# Patient Record
Sex: MALE | Employment: FULL TIME | URBAN - METROPOLITAN AREA
[De-identification: names, ages, dates, MRNs, and addresses within clinical notes are randomized per-mention and may not be internally consistent; named-entity substitution may affect disease eponyms.]

---

## 2017-03-24 ENCOUNTER — OFFICE VISIT (OUTPATIENT)
Dept: FAMILY MEDICINE CLINIC | Facility: CLINIC | Age: 57
End: 2017-03-24

## 2017-03-24 ENCOUNTER — HOSPITAL ENCOUNTER (OUTPATIENT)
Dept: GENERAL RADIOLOGY | Age: 57
Discharge: HOME | End: 2017-03-24
Attending: FAMILY MEDICINE
Payer: COMMERCIAL

## 2017-03-24 VITALS
RESPIRATION RATE: 20 BRPM | BODY MASS INDEX: 26.04 KG/M2 | SYSTOLIC BLOOD PRESSURE: 134 MMHG | DIASTOLIC BLOOD PRESSURE: 88 MMHG | TEMPERATURE: 98 F | HEIGHT: 71 IN | HEART RATE: 67 BPM | WEIGHT: 186 LBS

## 2017-03-24 DIAGNOSIS — R05.9 COUGH: Primary | ICD-10-CM

## 2017-03-24 DIAGNOSIS — R05.9 COUGH: ICD-10-CM

## 2017-03-24 PROCEDURE — 71020 XR CHEST PA + LAT CHEST (CPT=71020): CPT

## 2017-03-24 PROCEDURE — 99203 OFFICE O/P NEW LOW 30 MIN: CPT | Performed by: FAMILY MEDICINE

## 2017-03-24 PROCEDURE — 99212 OFFICE O/P EST SF 10 MIN: CPT | Performed by: FAMILY MEDICINE

## 2017-03-24 NOTE — PROGRESS NOTES
Blood pressure 134/88, pulse 67, temperature 98.2 °F (36.8 °C), temperature source Oral, resp. rate 20, height 5' 11\" (1.803 m), weight 186 lb (84.369 kg).   Patient presents today complaining of a 20 day history of a cough that is nocturnal.  He has a dog

## 2017-07-06 ENCOUNTER — OFFICE VISIT (OUTPATIENT)
Dept: PODIATRY CLINIC | Facility: CLINIC | Age: 57
End: 2017-07-06

## 2017-07-06 DIAGNOSIS — M72.2 PLANTAR FASCIITIS, LEFT: Primary | ICD-10-CM

## 2017-07-06 PROCEDURE — 99243 OFF/OP CNSLTJ NEW/EST LOW 30: CPT | Performed by: PODIATRIST

## 2017-07-06 PROCEDURE — 99212 OFFICE O/P EST SF 10 MIN: CPT | Performed by: PODIATRIST

## 2017-07-06 PROCEDURE — L3060 FOOT ARCH SUPP LONGITUD/META: HCPCS | Performed by: PODIATRIST

## 2017-07-06 RX ORDER — MELOXICAM 15 MG/1
15 TABLET ORAL
Qty: 30 TABLET | Refills: 1 | Status: CANCELLED | OUTPATIENT
Start: 2017-07-06

## 2017-07-06 NOTE — PROGRESS NOTES
HPI:    Patient ID: Emerita Kohler is a 60year old male. HPI   This pleasant 13-year-old male presents as a new patient to me on consult from [address] [place] [address]. Heel pain that has been present for as much as 4-5 months.   The pain came on suddenly and is u (primary encounter diagnosis)    No orders of the defined types were placed in this encounter.       Meds This Visit:  No prescriptions requested or ordered in this encounter    Imaging & Referrals:  None       WW#[id number]

## 2017-11-17 ENCOUNTER — OFFICE VISIT (OUTPATIENT)
Dept: FAMILY MEDICINE CLINIC | Facility: CLINIC | Age: 57
End: 2017-11-17

## 2017-11-17 VITALS
HEART RATE: 76 BPM | HEIGHT: 71 IN | DIASTOLIC BLOOD PRESSURE: 78 MMHG | WEIGHT: 188 LBS | BODY MASS INDEX: 26.32 KG/M2 | SYSTOLIC BLOOD PRESSURE: 130 MMHG

## 2017-11-17 DIAGNOSIS — Z00.00 ROUTINE PHYSICAL EXAMINATION: Primary | ICD-10-CM

## 2017-11-17 DIAGNOSIS — G89.29 CHRONIC MIDLINE THORACIC BACK PAIN: ICD-10-CM

## 2017-11-17 DIAGNOSIS — E78.00 PURE HYPERCHOLESTEROLEMIA: ICD-10-CM

## 2017-11-17 DIAGNOSIS — M54.6 CHRONIC MIDLINE THORACIC BACK PAIN: ICD-10-CM

## 2017-11-17 PROCEDURE — 99396 PREV VISIT EST AGE 40-64: CPT | Performed by: FAMILY MEDICINE

## 2017-11-17 PROCEDURE — 90715 TDAP VACCINE 7 YRS/> IM: CPT | Performed by: FAMILY MEDICINE

## 2017-11-17 PROCEDURE — 90471 IMMUNIZATION ADMIN: CPT | Performed by: FAMILY MEDICINE

## 2017-11-17 RX ORDER — NAPROXEN 500 MG/1
500 TABLET ORAL 2 TIMES DAILY WITH MEALS
Qty: 60 TABLET | Refills: 1 | Status: SHIPPED | OUTPATIENT
Start: 2017-11-17 | End: 2020-07-10 | Stop reason: ALTCHOICE

## 2017-11-17 NOTE — PROGRESS NOTES
Blood pressure 130/78, pulse 76, height 5' 11\" (1.803 m), weight 188 lb (85.3 kg). REASON FOR VISIT:    Shelley Norton is a 59year old male who presents for an [address]. There is no problem list on file for this patient. GLUCOSE, GLU      Gonorrhea Screening If high risk No results found for: GONOCOCCUS    HIV Screening For all adults age 22-65, older adults at increased risk No results found for: HIV    Syphilis Screening Screen if pregnant or high risk No results found f skin lesions  EYES: denies blurred vision or double vision  HEENT: denies nasal congestion, sinus pain or ST  LUNGS: denies shortness of breath with exertion  CARDIOVASCULAR: denies chest pain on exertion  GI: denies abdominal pain, denies heartburn  : d Td/Tdap once then every 10 years   HPV Males 11-21   Zoster (Shingles) 60 and older: one dose   Varicella 2 doses if not immune   MMR 1-2 doses if born after 1958 and not immune   1. Routine physical examination      2.  Chronic midline thoracic back pain

## 2017-11-29 ENCOUNTER — OFFICE VISIT (OUTPATIENT)
Dept: PHYSICAL THERAPY | Age: 57
End: 2017-11-29
Attending: FAMILY MEDICINE
Payer: COMMERCIAL

## 2017-11-29 DIAGNOSIS — M54.6 CHRONIC MIDLINE THORACIC BACK PAIN: ICD-10-CM

## 2017-11-29 DIAGNOSIS — G89.29 CHRONIC MIDLINE THORACIC BACK PAIN: ICD-10-CM

## 2017-11-29 PROCEDURE — 97162 PT EVAL MOD COMPLEX 30 MIN: CPT | Performed by: PHYSICAL THERAPIST

## 2017-11-29 NOTE — PROGRESS NOTES
CERVICAL SPINE EVALUATION:   Referring Physician: Bakari Piedra DO  Diagnosis: Chronic midline thoracic back pain (M54.6,G89.29) Date of Service: 11/29/2017   Date of Onset: 1 Month ago        SUBJECTIVE:   PATIENT SUMMARY:    Leslie salter a Patient to consistently perform their HEP and it's progression to maintain their improved condition. 2.  Patient to have reduced, centralized, and abolished symptoms in the neck to enable easier home, work, functional, and recreational activities.    3. Pa

## 2017-12-01 ENCOUNTER — OFFICE VISIT (OUTPATIENT)
Dept: PHYSICAL THERAPY | Age: 57
End: 2017-12-01
Attending: FAMILY MEDICINE
Payer: COMMERCIAL

## 2017-12-01 PROCEDURE — 97110 THERAPEUTIC EXERCISES: CPT | Performed by: PHYSICAL THERAPIST

## 2017-12-01 NOTE — PROGRESS NOTES
Date: 12/1/2017     Dx: Chronic midline thoracic back pain (M54.6,G89.29)         Authorized # of Visits:  18       Referring MD: Troy Sanchez  Next MD visit: none scheduled    Medication Changes since last visit?: No    Subjective: Patient reports that his ne

## 2017-12-06 ENCOUNTER — APPOINTMENT (OUTPATIENT)
Dept: PHYSICAL THERAPY | Age: 57
End: 2017-12-06
Attending: FAMILY MEDICINE
Payer: COMMERCIAL

## 2017-12-06 ENCOUNTER — TELEPHONE (OUTPATIENT)
Dept: PHYSICAL THERAPY | Age: 57
End: 2017-12-06

## 2017-12-13 ENCOUNTER — OFFICE VISIT (OUTPATIENT)
Dept: PHYSICAL THERAPY | Age: 57
End: 2017-12-13
Attending: FAMILY MEDICINE
Payer: COMMERCIAL

## 2017-12-13 PROCEDURE — 97110 THERAPEUTIC EXERCISES: CPT | Performed by: PHYSICAL THERAPIST

## 2017-12-15 ENCOUNTER — OFFICE VISIT (OUTPATIENT)
Dept: PHYSICAL THERAPY | Age: 57
End: 2017-12-15
Attending: FAMILY MEDICINE
Payer: COMMERCIAL

## 2017-12-15 PROCEDURE — 97110 THERAPEUTIC EXERCISES: CPT | Performed by: PHYSICAL THERAPIST

## 2017-12-15 NOTE — PROGRESS NOTES
Date: 12/15/2017     Dx: Chronic midline thoracic back pain (M54.6,G89.29)         Authorized # of Visits:  18       Referring MD: Christiano Ordonez  Next MD visit: none scheduled    Medication Changes since last visit?: No    Subjective: Patient reports that he ha Patient was instructed on his new HEP (c/s retraction with self OP and supine c/s extension hang). Goals:   Goals     • Therapy Goals            1. Patient to consistently perform their HEP and it's progression to maintain their improved condition.

## 2017-12-20 ENCOUNTER — OFFICE VISIT (OUTPATIENT)
Dept: PHYSICAL THERAPY | Age: 57
End: 2017-12-20
Attending: FAMILY MEDICINE
Payer: COMMERCIAL

## 2017-12-20 PROCEDURE — 97110 THERAPEUTIC EXERCISES: CPT | Performed by: PHYSICAL THERAPIST

## 2017-12-20 NOTE — PROGRESS NOTES
Date: 12/20/2017     Dx: Chronic midline thoracic back pain (M54.6,G89.29)         Authorized # of Visits:  18       Referring MD: Crissy Rojas MD visit: none scheduled    Medication Changes since last visit?: No    Subjective: Patient reports that he ha correction in supine and SLY     Prone: (B) UE ext., h.abd., rhomboids, scaption 2 lbs 10 reps x 5 cts; NE    Seated: c/s retraction with self OP x 10 reps;                 Assessment:   Patient is slowly progressing with c/s retractions with overpressure

## 2017-12-22 ENCOUNTER — OFFICE VISIT (OUTPATIENT)
Dept: PHYSICAL THERAPY | Age: 57
End: 2017-12-22
Attending: FAMILY MEDICINE
Payer: COMMERCIAL

## 2017-12-22 PROCEDURE — 97110 THERAPEUTIC EXERCISES: CPT | Performed by: PHYSICAL THERAPIST

## 2017-12-22 NOTE — PROGRESS NOTES
Date: 12/22/2017     Dx: Chronic midline thoracic back pain (M54.6,G89.29)         Authorized # of Visits:  18       Referring MD: Vicky Dan  Next MD visit: none scheduled    Medication Changes since last visit?: No    Subjective: Patient reports that he ha lumbar roll: c/s retraction + self OP 2 x 10 reps; NE     + manual OP 3 x 10 reps; Dec, B     + retraction mobs spine 3 x 10 reps; Dec, A    Sleeping posture correction in supine and SLY     Prone: (B) UE ext., h.abd., rhomboids, scaption 2 lbs 10 reps x 5

## 2017-12-27 ENCOUNTER — OFFICE VISIT (OUTPATIENT)
Dept: PHYSICAL THERAPY | Age: 57
End: 2017-12-27
Attending: FAMILY MEDICINE
Payer: COMMERCIAL

## 2017-12-27 PROCEDURE — 97110 THERAPEUTIC EXERCISES: CPT | Performed by: PHYSICAL THERAPIST

## 2017-12-27 NOTE — PROGRESS NOTES
Date: 12/27/2017     Dx: Chronic midline thoracic back pain (M54.6,G89.29)         Authorized # of Visits:  18       Referring MD: Merly Rojas MD visit: none scheduled    Medication Changes since last visit?: No    Subjective: Patient sates that the (R) Scifit: L4 3 mins fwd/bkwd; postural training/conditioning    Sitting posture correction with lumbar roll: c/s retraction + self OP 2 x 10 reps; NE     + manual OP 3 x 10 reps; Dec, B     + retraction mobs spine 3 x 10 reps; Dec, A    Sleeping posture bandar symptoms in the neck to enable easier home, work, functional, and recreational activities. 3. Patient to have WNL of CROM in all directions to allow a return to all normal activities (driving, watching TV) without compensation of deviation.    4. Patient

## 2017-12-29 ENCOUNTER — APPOINTMENT (OUTPATIENT)
Dept: PHYSICAL THERAPY | Age: 57
End: 2017-12-29
Attending: FAMILY MEDICINE
Payer: COMMERCIAL

## 2018-01-05 ENCOUNTER — APPOINTMENT (OUTPATIENT)
Dept: PHYSICAL THERAPY | Age: 58
End: 2018-01-05
Attending: FAMILY MEDICINE
Payer: COMMERCIAL

## 2018-01-29 ENCOUNTER — APPOINTMENT (OUTPATIENT)
Dept: LAB | Age: 58
End: 2018-01-29
Attending: FAMILY MEDICINE
Payer: COMMERCIAL

## 2018-01-29 DIAGNOSIS — E78.00 PURE HYPERCHOLESTEROLEMIA: ICD-10-CM

## 2018-01-29 LAB
ALT SERPL-CCNC: 21 U/L (ref 17–63)
ANION GAP SERPL CALC-SCNC: 7 MMOL/L (ref 0–18)
AST SERPL-CCNC: 24 U/L (ref 15–41)
BUN SERPL-MCNC: 13 MG/DL (ref 8–20)
BUN/CREAT SERPL: 13.5 (ref 10–20)
CALCIUM SERPL-MCNC: 9.1 MG/DL (ref 8.5–10.5)
CHLORIDE SERPL-SCNC: 107 MMOL/L (ref 95–110)
CHOLEST SERPL-MCNC: 252 MG/DL (ref 110–200)
CO2 SERPL-SCNC: 23 MMOL/L (ref 22–32)
CREAT SERPL-MCNC: 0.96 MG/DL (ref 0.5–1.5)
GLUCOSE SERPL-MCNC: 100 MG/DL (ref 70–99)
HDLC SERPL-MCNC: 98 MG/DL
LDLC SERPL CALC-MCNC: 123 MG/DL (ref 0–99)
NONHDLC SERPL-MCNC: 154 MG/DL
OSMOLALITY UR CALC.SUM OF ELEC: 284 MOSM/KG (ref 275–295)
POTASSIUM SERPL-SCNC: 3.6 MMOL/L (ref 3.3–5.1)
PSA SERPL-MCNC: 0.7 NG/ML (ref 0–4)
SODIUM SERPL-SCNC: 137 MMOL/L (ref 136–144)
TRIGL SERPL-MCNC: 156 MG/DL (ref 1–149)
TSH SERPL-ACNC: 1.63 UIU/ML (ref 0.45–5.33)

## 2018-01-29 PROCEDURE — 84450 TRANSFERASE (AST) (SGOT): CPT

## 2018-01-29 PROCEDURE — 84443 ASSAY THYROID STIM HORMONE: CPT

## 2018-01-29 PROCEDURE — 84460 ALANINE AMINO (ALT) (SGPT): CPT

## 2018-01-29 PROCEDURE — 80061 LIPID PANEL: CPT

## 2018-01-29 PROCEDURE — 80048 BASIC METABOLIC PNL TOTAL CA: CPT

## 2018-01-29 PROCEDURE — 36415 COLL VENOUS BLD VENIPUNCTURE: CPT

## 2018-01-31 ENCOUNTER — TELEPHONE (OUTPATIENT)
Dept: FAMILY MEDICINE CLINIC | Facility: CLINIC | Age: 58
End: 2018-01-31

## 2018-01-31 DIAGNOSIS — N50.819 TESTICULAR PAIN: Primary | ICD-10-CM

## 2018-03-16 ENCOUNTER — HOSPITAL ENCOUNTER (OUTPATIENT)
Dept: GENERAL RADIOLOGY | Age: 58
Discharge: HOME | End: 2018-03-16
Attending: FAMILY MEDICINE

## 2018-03-16 ENCOUNTER — HOSPITAL ENCOUNTER (OUTPATIENT)
Dept: CT IMAGING | Age: 58
Discharge: HOME | End: 2018-03-16
Attending: FAMILY MEDICINE

## 2018-03-16 DIAGNOSIS — Z13.6 SCREENING FOR HEART DISEASE: ICD-10-CM

## 2018-03-16 NOTE — PROGRESS NOTES
Pt seen at [facility], [facility] for CTHS:  PRELIMINARY SCORE= 0.0  BP= 122/78  Cholestec labs as follows: Labs done 1/29/18  TC=  HDL=  LDL=  TG=  GLUCOSE=  All results and risk factors discussed with patient; all questions and concerns addressed.   Educational h

## 2019-02-27 ENCOUNTER — TELEPHONE (OUTPATIENT)
Dept: FAMILY MEDICINE CLINIC | Facility: CLINIC | Age: 59
End: 2019-02-27

## 2019-05-22 ENCOUNTER — APPOINTMENT (OUTPATIENT)
Dept: OTHER | Facility: HOSPITAL | Age: 59
End: 2019-05-22
Attending: EMERGENCY MEDICINE

## 2019-08-28 ENCOUNTER — OFFICE VISIT (OUTPATIENT)
Dept: FAMILY MEDICINE CLINIC | Facility: CLINIC | Age: 59
End: 2019-08-28
Payer: COMMERCIAL

## 2019-08-28 VITALS
SYSTOLIC BLOOD PRESSURE: 120 MMHG | HEIGHT: 71 IN | DIASTOLIC BLOOD PRESSURE: 80 MMHG | WEIGHT: 191.38 LBS | BODY MASS INDEX: 26.79 KG/M2 | HEART RATE: 70 BPM

## 2019-08-28 DIAGNOSIS — M54.6 CHRONIC BILATERAL THORACIC BACK PAIN: ICD-10-CM

## 2019-08-28 DIAGNOSIS — Z00.00 ROUTINE PHYSICAL EXAMINATION: Primary | ICD-10-CM

## 2019-08-28 DIAGNOSIS — G89.29 CHRONIC BILATERAL THORACIC BACK PAIN: ICD-10-CM

## 2019-08-28 DIAGNOSIS — N50.819 TESTICULAR PAIN: ICD-10-CM

## 2019-08-28 DIAGNOSIS — E78.00 PURE HYPERCHOLESTEROLEMIA: ICD-10-CM

## 2019-08-28 DIAGNOSIS — Z12.11 ENCOUNTER FOR SCREENING COLONOSCOPY: ICD-10-CM

## 2019-08-28 PROCEDURE — 99396 PREV VISIT EST AGE 40-64: CPT | Performed by: FAMILY MEDICINE

## 2019-08-28 NOTE — PROGRESS NOTES
Blood pressure 120/80, pulse 70, height 5' 11\" (1.803 m), weight 191 lb 6.4 oz (86.8 kg). REASON FOR VISIT:    Marshall Jackson is a 59year old male who presents for an [address].   COMPLAINS OF INTERMITTENT TESTICULAR PAIN ALSO THORACIC found for: [facility]       SPECIFIC DISEASE MONITORING Internal Lab or Procedure   No disease specific diagnoses    ALLERGIES:   No Known Allergies  CURRENT MEDICATIONS:     Current Outpatient Medications:  naproxen (NAPROSYN) 500 MG Oral Tab Take 1 table deferred  MUSCULOSKELETAL: back is not tender, FROM of the back  EXTREMITIES: no cyanosis, clubbing or edema  NEURO: Oriented times three, cranial nerves are intact, motor and sensory are grossly intact      ASSESSMENT AND OTHER RELEVANT CHRONIC CONDITIONS colonoscopy    - CT CALCIUM SCORING; Future  - GASTRO - INTERNAL    3. Pure hypercholesterolemia    - COMP METABOLIC PANEL (14); Future  - LIPID PANEL; Future  - PSA SCREEN; Future  - TSH W REFLEX TO FREE T4; Future  - CT CALCIUM SCORING; Future    4.  Test

## 2019-08-29 ENCOUNTER — APPOINTMENT (OUTPATIENT)
Dept: LAB | Age: 59
End: 2019-08-29
Attending: FAMILY MEDICINE
Payer: COMMERCIAL

## 2019-08-29 DIAGNOSIS — Z00.00 ROUTINE PHYSICAL EXAMINATION: ICD-10-CM

## 2019-08-29 DIAGNOSIS — E78.00 PURE HYPERCHOLESTEROLEMIA: ICD-10-CM

## 2019-08-29 LAB
ALBUMIN SERPL-MCNC: 3.6 G/DL (ref 3.4–5)
ALBUMIN/GLOB SERPL: 0.9 {RATIO} (ref 1–2)
ALP LIVER SERPL-CCNC: 67 U/L (ref 45–117)
ALT SERPL-CCNC: 21 U/L (ref 16–61)
ANION GAP SERPL CALC-SCNC: 7 MMOL/L (ref 0–18)
AST SERPL-CCNC: 19 U/L (ref 15–37)
BILIRUB SERPL-MCNC: 0.7 MG/DL (ref 0.1–2)
BUN BLD-MCNC: 14 MG/DL (ref 7–18)
BUN/CREAT SERPL: 15.4 (ref 10–20)
CALCIUM BLD-MCNC: 9.1 MG/DL (ref 8.5–10.1)
CHLORIDE SERPL-SCNC: 106 MMOL/L (ref 98–112)
CHOLEST SMN-MCNC: 224 MG/DL (ref ?–200)
CO2 SERPL-SCNC: 26 MMOL/L (ref 21–32)
COMPLEXED PSA SERPL-MCNC: 0.76 NG/ML (ref ?–4)
CREAT BLD-MCNC: 0.91 MG/DL (ref 0.7–1.3)
GLOBULIN PLAS-MCNC: 4.2 G/DL (ref 2.8–4.4)
GLUCOSE BLD-MCNC: 103 MG/DL (ref 70–99)
HDLC SERPL-MCNC: 107 MG/DL (ref 40–59)
LDLC SERPL CALC-MCNC: 91 MG/DL (ref ?–100)
M PROTEIN MFR SERPL ELPH: 7.8 G/DL (ref 6.4–8.2)
NONHDLC SERPL-MCNC: 117 MG/DL (ref ?–130)
OSMOLALITY SERPL CALC.SUM OF ELEC: 289 MOSM/KG (ref 275–295)
PATIENT FASTING: YES
PATIENT FASTING: YES
POTASSIUM SERPL-SCNC: 3.9 MMOL/L (ref 3.5–5.1)
SODIUM SERPL-SCNC: 139 MMOL/L (ref 136–145)
TRIGL SERPL-MCNC: 131 MG/DL (ref 30–149)
TSI SER-ACNC: 1.85 MIU/ML (ref 0.36–3.74)
VLDLC SERPL CALC-MCNC: 26 MG/DL (ref 0–30)

## 2019-08-29 PROCEDURE — 84443 ASSAY THYROID STIM HORMONE: CPT

## 2019-08-29 PROCEDURE — 80061 LIPID PANEL: CPT

## 2019-08-29 PROCEDURE — 80053 COMPREHEN METABOLIC PANEL: CPT

## 2019-08-29 PROCEDURE — 36415 COLL VENOUS BLD VENIPUNCTURE: CPT

## 2019-12-06 ENCOUNTER — HOSPITAL ENCOUNTER (OUTPATIENT)
Dept: ULTRASOUND IMAGING | Age: 59
Discharge: HOME | End: 2019-12-06
Attending: FAMILY MEDICINE
Payer: COMMERCIAL

## 2019-12-06 DIAGNOSIS — N50.819 TESTICULAR PAIN: ICD-10-CM

## 2019-12-06 PROCEDURE — 93975 VASCULAR STUDY: CPT | Performed by: FAMILY MEDICINE

## 2019-12-06 PROCEDURE — 76870 US EXAM SCROTUM: CPT | Performed by: FAMILY MEDICINE

## 2020-03-24 ENCOUNTER — TELEPHONE (OUTPATIENT)
Dept: FAMILY MEDICINE CLINIC | Facility: CLINIC | Age: 60
End: 2020-03-24

## 2020-03-24 DIAGNOSIS — E78.00 PURE HYPERCHOLESTEROLEMIA: Primary | ICD-10-CM

## 2020-03-24 RX ORDER — CLOBETASOL PROPIONATE 0.5 MG/ML
1 LOTION TOPICAL 2 TIMES DAILY
Qty: 1 BOTTLE | Refills: 1 | Status: SHIPPED | OUTPATIENT
Start: 2020-03-24

## 2020-06-26 DIAGNOSIS — Z78.9 PARTICIPANT IN HEALTH AND WELLNESS PLAN: Primary | ICD-10-CM

## 2020-07-03 ENCOUNTER — NURSE ONLY (OUTPATIENT)
Dept: LAB | Age: 60
End: 2020-07-03
Attending: PREVENTIVE MEDICINE

## 2020-07-03 DIAGNOSIS — Z78.9 PARTICIPANT IN HEALTH AND WELLNESS PLAN: ICD-10-CM

## 2020-07-03 PROCEDURE — 86769 SARS-COV-2 COVID-19 ANTIBODY: CPT

## 2020-07-07 LAB — SARS-COV-2 IGG SERPLBLD QL IA.RAPID: NEGATIVE

## 2020-07-10 ENCOUNTER — HOSPITAL ENCOUNTER (OUTPATIENT)
Dept: GENERAL RADIOLOGY | Age: 60
Discharge: HOME | End: 2020-07-10
Attending: FAMILY MEDICINE
Payer: COMMERCIAL

## 2020-07-10 ENCOUNTER — OFFICE VISIT (OUTPATIENT)
Dept: FAMILY MEDICINE CLINIC | Facility: CLINIC | Age: 60
End: 2020-07-10
Payer: COMMERCIAL

## 2020-07-10 VITALS
SYSTOLIC BLOOD PRESSURE: 156 MMHG | DIASTOLIC BLOOD PRESSURE: 92 MMHG | WEIGHT: 192.5 LBS | HEART RATE: 50 BPM | HEIGHT: 71 IN | BODY MASS INDEX: 26.95 KG/M2

## 2020-07-10 DIAGNOSIS — M54.12 CERVICAL RADICULOPATHY: ICD-10-CM

## 2020-07-10 DIAGNOSIS — Z12.11 ENCOUNTER FOR SCREENING COLONOSCOPY: Primary | ICD-10-CM

## 2020-07-10 PROCEDURE — 72040 X-RAY EXAM NECK SPINE 2-3 VW: CPT | Performed by: FAMILY MEDICINE

## 2020-07-10 PROCEDURE — 99214 OFFICE O/P EST MOD 30 MIN: CPT | Performed by: FAMILY MEDICINE

## 2020-07-10 RX ORDER — METHYLPREDNISOLONE 4 MG/1
TABLET ORAL
Qty: 1 KIT | Refills: 1 | Status: SHIPPED | OUTPATIENT
Start: 2020-07-10

## 2020-07-10 NOTE — PROGRESS NOTES
Blood pressure (!) 156/92, pulse 50, height 5' 11\" (1.803 m), weight 192 lb 8 oz (87.3 kg). Patient presents today complaining of numbness of the left arm that is intermittent. Denies weakness. This began a week ago.   Reports that he does have some d

## 2020-07-27 ENCOUNTER — APPOINTMENT (OUTPATIENT)
Dept: OTHER | Facility: HOSPITAL | Age: 60
End: 2020-07-27
Attending: EMERGENCY MEDICINE

## 2020-07-30 ENCOUNTER — OFFICE VISIT (OUTPATIENT)
Dept: DERMATOLOGY CLINIC | Facility: CLINIC | Age: 60
End: 2020-07-30
Payer: COMMERCIAL

## 2020-07-30 DIAGNOSIS — L63.9 ALOPECIA AREATA: Primary | ICD-10-CM

## 2020-07-30 PROCEDURE — 99202 OFFICE O/P NEW SF 15 MIN: CPT | Performed by: DERMATOLOGY

## 2020-07-30 RX ORDER — CLOBETASOL PROPIONATE 0.46 MG/ML
SOLUTION TOPICAL
Qty: 50 ML | Refills: 6 | Status: SHIPPED | OUTPATIENT
Start: 2020-07-30

## 2020-08-03 NOTE — PROGRESS NOTES
Yadi Sanderson is a 62year old male. Patient presents with:  Derm Problem: New pt presents with hair loss to posterior scalp x3 months.  rx'd clobetasol with no improvement. Other: pt c/o itching to whole scalp. No treatments tried. on file    Tobacco Use      Smoking status: Former Smoker    Substance and Sexual Activity      Alcohol use:  Yes        Alcohol/week: 0.0 standard drinks        Comment: sometimes 1 beer a day      Drug use: No      Sexual activity: Not on file    Lifestyl difference with the clobetasol. Notes some itching irritation more diffusely at times    Eyebrows eyelashes have been fine nothing else really new or different  Patient presents with concerns above. ROS:    Denies any other systemic complaints.   No f p.r.n. The patient indicates understanding of these issues and agrees to the plan. The patient is asked to return as noted in follow-up /as noted above    This note was generated using Dragon voice recognition software.   Please contact me regarding any

## 2020-08-07 ENCOUNTER — TELEPHONE (OUTPATIENT)
Dept: GASTROENTEROLOGY | Facility: CLINIC | Age: 60
End: 2020-08-07

## 2020-08-07 ENCOUNTER — OFFICE VISIT (OUTPATIENT)
Dept: GASTROENTEROLOGY | Facility: CLINIC | Age: 60
End: 2020-08-07
Payer: COMMERCIAL

## 2020-08-07 VITALS
HEIGHT: 71 IN | BODY MASS INDEX: 26.85 KG/M2 | WEIGHT: 191.81 LBS | DIASTOLIC BLOOD PRESSURE: 57 MMHG | SYSTOLIC BLOOD PRESSURE: 128 MMHG | HEART RATE: 57 BPM

## 2020-08-07 DIAGNOSIS — Z12.11 SCREENING FOR COLORECTAL CANCER: Primary | ICD-10-CM

## 2020-08-07 DIAGNOSIS — Z98.890 HISTORY OF COLONOSCOPY: ICD-10-CM

## 2020-08-07 DIAGNOSIS — Z12.12 SCREENING FOR COLORECTAL CANCER: Primary | ICD-10-CM

## 2020-08-07 DIAGNOSIS — Z12.11 COLON CANCER SCREENING: Primary | ICD-10-CM

## 2020-08-07 DIAGNOSIS — K64.9 HEMORRHOIDS, UNSPECIFIED HEMORRHOID TYPE: ICD-10-CM

## 2020-08-07 PROCEDURE — 3074F SYST BP LT 130 MM HG: CPT | Performed by: INTERNAL MEDICINE

## 2020-08-07 PROCEDURE — 3078F DIAST BP <80 MM HG: CPT | Performed by: INTERNAL MEDICINE

## 2020-08-07 PROCEDURE — 99243 OFF/OP CNSLTJ NEW/EST LOW 30: CPT | Performed by: INTERNAL MEDICINE

## 2020-08-07 PROCEDURE — 3008F BODY MASS INDEX DOCD: CPT | Performed by: INTERNAL MEDICINE

## 2020-08-07 RX ORDER — POLYETHYLENE GLYCOL 3350, SODIUM CHLORIDE, SODIUM BICARBONATE, POTASSIUM CHLORIDE 420; 11.2; 5.72; 1.48 G/4L; G/4L; G/4L; G/4L
POWDER, FOR SOLUTION ORAL
Qty: 1 BOTTLE | Refills: 0 | Status: ON HOLD | OUTPATIENT
Start: 2020-08-07 | End: 2020-09-15

## 2020-08-07 NOTE — PATIENT INSTRUCTIONS
1. Schedule colonoscopy with monitored anesthesia care (MAC) at [address], or [facility]    2.  bowel prep from pharmacy ([facility] )    3. Continue all medications for procedure    4. Read all bowel prep instructions carefully    5.  AVOI

## 2020-08-07 NOTE — TELEPHONE ENCOUNTER
Scheduled for: Colonoscopy 67164  Provider Name:  Dr Domo Desir  Date:  09/15/2020  Location: [facility]   Sedation:  MAC  Time:  12:15PM (pt is aware to arrive at 11:15AM)  Prep:  Trilyte  Meds/Allergies Reconciled?:    Physician reviewed    Diagnosis with codes

## 2020-08-07 NOTE — H&P
[facility], [facility] - Gastroenterology                                                                                                  Clinic History and Physical kg).    General:awake, cooperative, no acute distress  HEENT: EOMI, no scleral icterus, MMM; oral pharnyx is without exudates or lesions  Neck: no lymphadenopathy; thyroid is not enlarged and without nodules  CV: RRR  Resp: non-labored breathing  Abd: soft

## 2020-09-12 ENCOUNTER — APPOINTMENT (OUTPATIENT)
Dept: LAB | Age: 60
End: 2020-09-12
Attending: INTERNAL MEDICINE
Payer: COMMERCIAL

## 2020-09-12 DIAGNOSIS — Z01.818 PREOP TESTING: ICD-10-CM

## 2020-09-14 LAB — SARS-COV-2 RNA RESP QL NAA+PROBE: NOT DETECTED

## 2020-09-15 ENCOUNTER — ANESTHESIA (OUTPATIENT)
Dept: ENDOSCOPY | Age: 60
End: 2020-09-15
Payer: COMMERCIAL

## 2020-09-15 ENCOUNTER — ANESTHESIA EVENT (OUTPATIENT)
Dept: ENDOSCOPY | Age: 60
End: 2020-09-15
Payer: COMMERCIAL

## 2020-09-15 ENCOUNTER — HOSPITAL ENCOUNTER (OUTPATIENT)
Age: 60
Setting detail: HOSPITAL OUTPATIENT SURGERY
Discharge: HOME | End: 2020-09-15
Attending: INTERNAL MEDICINE | Admitting: INTERNAL MEDICINE
Payer: COMMERCIAL

## 2020-09-15 VITALS
SYSTOLIC BLOOD PRESSURE: 146 MMHG | BODY MASS INDEX: 26.04 KG/M2 | WEIGHT: 186 LBS | HEIGHT: 71 IN | TEMPERATURE: 99 F | OXYGEN SATURATION: 98 % | DIASTOLIC BLOOD PRESSURE: 85 MMHG | HEART RATE: 50 BPM | RESPIRATION RATE: 14 BRPM

## 2020-09-15 DIAGNOSIS — Z12.11 COLON CANCER SCREENING: ICD-10-CM

## 2020-09-15 DIAGNOSIS — Z01.818 PREOP TESTING: Primary | ICD-10-CM

## 2020-09-15 PROCEDURE — 45385 COLONOSCOPY W/LESION REMOVAL: CPT | Performed by: INTERNAL MEDICINE

## 2020-09-15 PROCEDURE — 99070 SPECIAL SUPPLIES PHYS/QHP: CPT | Performed by: INTERNAL MEDICINE

## 2020-09-15 PROCEDURE — 45380 COLONOSCOPY AND BIOPSY: CPT | Performed by: INTERNAL MEDICINE

## 2020-09-15 RX ORDER — SODIUM CHLORIDE, SODIUM LACTATE, POTASSIUM CHLORIDE, CALCIUM CHLORIDE 600; 310; 30; 20 MG/100ML; MG/100ML; MG/100ML; MG/100ML
INJECTION, SOLUTION INTRAVENOUS CONTINUOUS
Status: CANCELLED | OUTPATIENT
Start: 2020-09-15

## 2020-09-15 RX ORDER — SODIUM CHLORIDE, SODIUM LACTATE, POTASSIUM CHLORIDE, CALCIUM CHLORIDE 600; 310; 30; 20 MG/100ML; MG/100ML; MG/100ML; MG/100ML
INJECTION, SOLUTION INTRAVENOUS CONTINUOUS
Status: DISCONTINUED | OUTPATIENT
Start: 2020-09-15 | End: 2020-09-15

## 2020-09-15 RX ORDER — NALOXONE HYDROCHLORIDE 0.4 MG/ML
80 INJECTION, SOLUTION INTRAMUSCULAR; INTRAVENOUS; SUBCUTANEOUS AS NEEDED
Status: CANCELLED | OUTPATIENT
Start: 2020-09-15 | End: 2020-09-15

## 2020-09-15 RX ADMIN — SODIUM CHLORIDE, SODIUM LACTATE, POTASSIUM CHLORIDE, CALCIUM CHLORIDE: 600; 310; 30; 20 INJECTION, SOLUTION INTRAVENOUS at 13:26:00

## 2020-09-15 RX ADMIN — SODIUM CHLORIDE, SODIUM LACTATE, POTASSIUM CHLORIDE, CALCIUM CHLORIDE: 600; 310; 30; 20 INJECTION, SOLUTION INTRAVENOUS at 13:58:00

## 2020-09-15 NOTE — OPERATIVE REPORT
Colonoscopy Report    Shruthi Nick     1960 Age 62year old   PCP Lulu Ryan.  Fallon Huang MD     Date of procedure: 09/15/20    Procedure: Colonoscopy w/ biopsy and snare polypectomy    Pre-operative diagnosis: colo immediate postoperative complications. The patient’s vital signs were monitored throughout the procedure and remained stable.     Estimated blood loss: insignificant    Specimens collected:  Colon and rectal polyps    Complications: none     Colonoscopy fin

## 2020-09-15 NOTE — ANESTHESIA PREPROCEDURE EVALUATION
Anesthesia PreOp Note    HPI:     Radha Cheatham is a 62year old male who presents for preoperative consultation requested by: Andriy Krishnamurthy MD    Date of Surgery: 9/15/2020    Procedure(s):  COLONOSCOPY  Indication: Colon cancer screening    Rele file      Transportation needs:        Medical: Not on file        Non-medical: Not on file    Tobacco Use      Smoking status: Former Smoker      Smokeless tobacco: Never Used    Substance and Sexual Activity      Alcohol use:  Yes        Alcohol/week: 0.0 respiration is 17.    09/15/20  1230   BP: 146/85   Pulse: 53   Resp: 17   Temp: 98.6 °F (37 °C)   TempSrc: Oral   Weight: 84.4 kg (186 lb)   Height: 1.803 m (5' 11\")        Anesthesia Evaluation     Patient summary reviewed and Nursing notes reviewed

## 2020-09-15 NOTE — ANESTHESIA POSTPROCEDURE EVALUATION
Patient: Taylor Aguilar    Procedure Summary     Date:  09/15/20 Room / Location:  [facility] ENDOSCOPY 01 / [facility] ENDO    Anesthesia Start:  3324 Anesthesia Stop:  1924    Procedure:  COLONOSCOPY (N/A ) Diagnosis:       Colon cancer screening      (polyps,)

## 2020-09-15 NOTE — H&P
History & Physical Examination    Patient Name: Yadi Sanderson  MRN: [id number]  CSN: [id number]  YOB: 1960    Diagnosis: colorectal cancer screening      Clobetasol Propionate 0.05 % External Solution, Use bid  As directed, Disp: 50 mL, Rf

## 2020-09-17 ENCOUNTER — TELEPHONE (OUTPATIENT)
Dept: GASTROENTEROLOGY | Facility: CLINIC | Age: 60
End: 2020-09-17

## 2020-09-17 NOTE — TELEPHONE ENCOUNTER
I mailed out colonoscopy results letter to pt  Updated health maintenance  Entered into 7 yr CLN recall  Recall colon in 7 years per.  Colon done 9/15/2020    GI staff: please place recall for colonoscopy in 7 years

## 2020-12-19 ENCOUNTER — IMMUNIZATION (OUTPATIENT)
Dept: LAB | Facility: HOSPITAL | Age: 60
End: 2020-12-19
Attending: PREVENTIVE MEDICINE
Payer: COMMERCIAL

## 2020-12-19 DIAGNOSIS — Z23 NEED FOR VACCINATION: ICD-10-CM

## 2020-12-19 PROCEDURE — 0001A PFIZER-BIONTECH COVID-19 VACCINE: CPT

## 2020-12-24 ENCOUNTER — TELEPHONE (OUTPATIENT)
Dept: INTERNAL MEDICINE CLINIC | Facility: HOSPITAL | Age: 60
End: 2020-12-24

## 2020-12-24 DIAGNOSIS — Z20.822 EXPOSURE TO COVID-19 VIRUS: Primary | ICD-10-CM

## 2020-12-24 NOTE — TELEPHONE ENCOUNTER
Department: facilities                                [] [facility]  []BERTHA   [x] [address] [facility]    Dept Manager/Supervisor/team or clinical lead: Haleigh Hackett    Position:  [] MD     [] RN     [] Respiratory Therapist     [] PCT     [x] Other     What abran a mask? (e.g., during meal breaks):  Yes [x]   No []    If yes, who: at lunch yesterday - names obtained  Do you share a workspace? Yes []   No [x]       If yes, with whom? Do you have any family members sick at home?      [] Yes    [x] No   If yes, expla

## 2020-12-28 ENCOUNTER — LAB ENCOUNTER (OUTPATIENT)
Dept: LAB | Age: 60
End: 2020-12-28
Attending: PREVENTIVE MEDICINE

## 2020-12-28 DIAGNOSIS — Z20.822 EXPOSURE TO COVID-19 VIRUS: ICD-10-CM

## 2020-12-30 NOTE — TELEPHONE ENCOUNTER
Results and RTW guidelines:    COVID RESULT GIVEN:      Test type:    [] Rapid         [x]       [x] NEGATIVE     Ordered  retest?  []Yes   [x] No (skip to RTW)      Notes:  Remains asymptomatic    RTW PLAN:    [] RTW 10 days with clearance f

## 2021-01-09 ENCOUNTER — IMMUNIZATION (OUTPATIENT)
Dept: LAB | Facility: HOSPITAL | Age: 61
End: 2021-01-09
Attending: PREVENTIVE MEDICINE

## 2021-01-09 DIAGNOSIS — Z23 NEED FOR VACCINATION: ICD-10-CM

## 2021-01-09 PROCEDURE — 0002A SARSCOV2 VAC 30MCG/0.3ML IM: CPT

## 2021-07-28 ENCOUNTER — APPOINTMENT (OUTPATIENT)
Dept: OTHER | Facility: HOSPITAL | Age: 61
End: 2021-07-28
Attending: EMERGENCY MEDICINE

## 2021-12-17 ENCOUNTER — IMMUNIZATION (OUTPATIENT)
Dept: LAB | Facility: HOSPITAL | Age: 61
End: 2021-12-17
Attending: EMERGENCY MEDICINE
Payer: COMMERCIAL

## 2021-12-17 DIAGNOSIS — Z23 NEED FOR VACCINATION: Primary | ICD-10-CM

## 2021-12-17 PROCEDURE — 0004A SARSCOV2 VAC 30MCG/0.3ML IM: CPT

## 2022-08-04 ENCOUNTER — APPOINTMENT (OUTPATIENT)
Dept: OCCUPATIONAL MEDICINE | Age: 62
End: 2022-08-04
Attending: FAMILY MEDICINE

## 2022-08-23 NOTE — PROGRESS NOTES
Date: 12/13/2017     Dx: Chronic midline thoracic back pain (M54.6,G89.29)         Authorized # of Visits:  18       Referring MD: Vicky Dan  Next MD visit: none scheduled    Medication Changes since last visit?: No    Subjective: Patient reports that he ha return to all normal activities (driving, watching TV) without compensation of deviation. 4. Patient to consistently have good posture to promote their symptom free condition.                 Plan:   Continue with directional preference exercise, posture Topical Sulfur Applications Counseling: Topical Sulfur Counseling: Patient counseled that this medication may cause skin irritation or allergic reactions.  In the event of skin irritation, the patient was advised to reduce the amount of the drug applied or use it less frequently.   The patient verbalized understanding of the proper use and possible adverse effects of topical sulfur application.  All of the patient's questions and concerns were addressed.

## 2022-10-11 ENCOUNTER — HOSPITAL ENCOUNTER (OUTPATIENT)
Age: 62
Discharge: HOME | End: 2022-10-11
Payer: COMMERCIAL

## 2022-10-11 VITALS
DIASTOLIC BLOOD PRESSURE: 85 MMHG | SYSTOLIC BLOOD PRESSURE: 139 MMHG | TEMPERATURE: 99 F | RESPIRATION RATE: 18 BRPM | OXYGEN SATURATION: 97 % | HEART RATE: 70 BPM

## 2022-10-11 DIAGNOSIS — Z20.822 ENCOUNTER FOR LABORATORY TESTING FOR COVID-19 VIRUS: Primary | ICD-10-CM

## 2022-10-11 DIAGNOSIS — U07.1 COVID-19: ICD-10-CM

## 2022-10-11 LAB — SARS-COV-2 RNA RESP QL NAA+PROBE: DETECTED

## 2022-10-11 PROCEDURE — 99213 OFFICE O/P EST LOW 20 MIN: CPT

## 2022-10-11 PROCEDURE — U0002 COVID-19 LAB TEST NON-CDC: HCPCS | Performed by: NURSE PRACTITIONER

## 2022-10-11 NOTE — ED INITIAL ASSESSMENT (HPI)
Pt presents to the IC with c/o cough beginning yesterday. Denies fevers. Wishes for covid test, was at a party last Saturday.

## 2022-10-28 ENCOUNTER — APPOINTMENT (OUTPATIENT)
Age: 62
End: 2022-10-28
Attending: PREVENTIVE MEDICINE
Payer: COMMERCIAL

## 2022-10-28 DIAGNOSIS — Z23 NEED FOR VACCINATION: Primary | ICD-10-CM

## 2022-10-28 PROCEDURE — 90471 IMMUNIZATION ADMIN: CPT

## 2023-02-06 ENCOUNTER — TELEPHONE (OUTPATIENT)
Dept: FAMILY MEDICINE CLINIC | Facility: CLINIC | Age: 63
End: 2023-02-06

## 2023-02-09 ENCOUNTER — OFFICE VISIT (OUTPATIENT)
Dept: DERMATOLOGY CLINIC | Facility: CLINIC | Age: 63
End: 2023-02-09

## 2023-02-09 DIAGNOSIS — L25.9 CONTACT DERMATITIS AND ECZEMA: Primary | ICD-10-CM

## 2023-02-09 PROCEDURE — 99213 OFFICE O/P EST LOW 20 MIN: CPT | Performed by: DERMATOLOGY

## 2023-02-09 RX ORDER — TRIAMCINOLONE ACETONIDE 1 MG/G
1 CREAM TOPICAL 2 TIMES DAILY
Qty: 454 G | Refills: 1 | Status: SHIPPED | OUTPATIENT
Start: 2023-02-09

## 2023-02-09 RX ORDER — PREDNISONE 10 MG/1
TABLET ORAL
Qty: 30 TABLET | Refills: 0 | Status: SHIPPED | OUTPATIENT
Start: 2023-02-09 | End: 2023-02-21

## 2023-04-19 ENCOUNTER — OFFICE VISIT (OUTPATIENT)
Dept: FAMILY MEDICINE CLINIC | Facility: CLINIC | Age: 63
End: 2023-04-19

## 2023-04-19 VITALS
HEIGHT: 71 IN | SYSTOLIC BLOOD PRESSURE: 120 MMHG | HEART RATE: 61 BPM | BODY MASS INDEX: 27.44 KG/M2 | WEIGHT: 196 LBS | DIASTOLIC BLOOD PRESSURE: 76 MMHG

## 2023-04-19 DIAGNOSIS — E78.00 PURE HYPERCHOLESTEROLEMIA: ICD-10-CM

## 2023-04-19 DIAGNOSIS — D17.1 LIPOMA OF TORSO: ICD-10-CM

## 2023-04-19 DIAGNOSIS — Z00.00 ROUTINE PHYSICAL EXAMINATION: Primary | ICD-10-CM

## 2023-04-19 PROCEDURE — 3008F BODY MASS INDEX DOCD: CPT | Performed by: FAMILY MEDICINE

## 2023-04-19 PROCEDURE — 3078F DIAST BP <80 MM HG: CPT | Performed by: FAMILY MEDICINE

## 2023-04-19 PROCEDURE — 99396 PREV VISIT EST AGE 40-64: CPT | Performed by: FAMILY MEDICINE

## 2023-04-19 PROCEDURE — 90471 IMMUNIZATION ADMIN: CPT | Performed by: FAMILY MEDICINE

## 2023-04-19 PROCEDURE — 90750 HZV VACC RECOMBINANT IM: CPT | Performed by: FAMILY MEDICINE

## 2023-04-19 PROCEDURE — 3074F SYST BP LT 130 MM HG: CPT | Performed by: FAMILY MEDICINE

## 2023-04-20 ENCOUNTER — LAB ENCOUNTER (OUTPATIENT)
Dept: LAB | Age: 63
End: 2023-04-20
Attending: FAMILY MEDICINE
Payer: COMMERCIAL

## 2023-04-20 DIAGNOSIS — R73.01 IFG (IMPAIRED FASTING GLUCOSE): ICD-10-CM

## 2023-04-20 DIAGNOSIS — Z00.00 ROUTINE PHYSICAL EXAMINATION: ICD-10-CM

## 2023-04-20 DIAGNOSIS — E78.00 PURE HYPERCHOLESTEROLEMIA: ICD-10-CM

## 2023-04-20 LAB
ALBUMIN SERPL-MCNC: 3.9 G/DL (ref 3.4–5)
ALBUMIN/GLOB SERPL: 0.9 {RATIO} (ref 1–2)
ALP LIVER SERPL-CCNC: 79 U/L
ALT SERPL-CCNC: 32 U/L
ANION GAP SERPL CALC-SCNC: 10 MMOL/L (ref 0–18)
AST SERPL-CCNC: 22 U/L (ref 15–37)
BASOPHILS # BLD AUTO: 0.05 X10(3) UL (ref 0–0.2)
BASOPHILS NFR BLD AUTO: 0.6 %
BILIRUB SERPL-MCNC: 0.4 MG/DL (ref 0.1–2)
BUN BLD-MCNC: 14 MG/DL (ref 7–18)
BUN/CREAT SERPL: 15.4 (ref 10–20)
CALCIUM BLD-MCNC: 9.3 MG/DL (ref 8.5–10.1)
CHLORIDE SERPL-SCNC: 105 MMOL/L (ref 98–112)
CHOLEST SERPL-MCNC: 224 MG/DL (ref ?–200)
CO2 SERPL-SCNC: 26 MMOL/L (ref 21–32)
COMPLEXED PSA SERPL-MCNC: 0.98 NG/ML (ref ?–4)
CREAT BLD-MCNC: 0.91 MG/DL
DEPRECATED RDW RBC AUTO: 38.8 FL (ref 35.1–46.3)
EOSINOPHIL # BLD AUTO: 0.15 X10(3) UL (ref 0–0.7)
EOSINOPHIL NFR BLD AUTO: 1.8 %
ERYTHROCYTE [DISTWIDTH] IN BLOOD BY AUTOMATED COUNT: 11.9 % (ref 11–15)
EST. AVERAGE GLUCOSE BLD GHB EST-MCNC: 117 MG/DL (ref 68–126)
FASTING PATIENT LIPID ANSWER: YES
FASTING STATUS PATIENT QL REPORTED: YES
GFR SERPLBLD BASED ON 1.73 SQ M-ARVRAT: 94 ML/MIN/1.73M2 (ref 60–?)
GLOBULIN PLAS-MCNC: 4.2 G/DL (ref 2.8–4.4)
GLUCOSE BLD-MCNC: 105 MG/DL (ref 70–99)
HBA1C MFR BLD: 5.7 % (ref ?–5.7)
HCT VFR BLD AUTO: 47.7 %
HDLC SERPL-MCNC: 108 MG/DL (ref 40–59)
HGB BLD-MCNC: 15.6 G/DL
IMM GRANULOCYTES # BLD AUTO: 0.02 X10(3) UL (ref 0–1)
IMM GRANULOCYTES NFR BLD: 0.2 %
LDLC SERPL CALC-MCNC: 96 MG/DL (ref ?–100)
LYMPHOCYTES # BLD AUTO: 1.24 X10(3) UL (ref 1–4)
LYMPHOCYTES NFR BLD AUTO: 14.9 %
MCH RBC QN AUTO: 29.3 PG (ref 26–34)
MCHC RBC AUTO-ENTMCNC: 32.7 G/DL (ref 31–37)
MCV RBC AUTO: 89.7 FL
MONOCYTES # BLD AUTO: 0.56 X10(3) UL (ref 0.1–1)
MONOCYTES NFR BLD AUTO: 6.7 %
NEUTROPHILS # BLD AUTO: 6.29 X10 (3) UL (ref 1.5–7.7)
NEUTROPHILS # BLD AUTO: 6.29 X10(3) UL (ref 1.5–7.7)
NEUTROPHILS NFR BLD AUTO: 75.8 %
NONHDLC SERPL-MCNC: 116 MG/DL (ref ?–130)
OSMOLALITY SERPL CALC.SUM OF ELEC: 293 MOSM/KG (ref 275–295)
PLATELET # BLD AUTO: 285 10(3)UL (ref 150–450)
POTASSIUM SERPL-SCNC: 4 MMOL/L (ref 3.5–5.1)
PROT SERPL-MCNC: 8.1 G/DL (ref 6.4–8.2)
RBC # BLD AUTO: 5.32 X10(6)UL
SODIUM SERPL-SCNC: 141 MMOL/L (ref 136–145)
TRIGL SERPL-MCNC: 119 MG/DL (ref 30–149)
TSI SER-ACNC: 0.96 MIU/ML (ref 0.36–3.74)
VLDLC SERPL CALC-MCNC: 19 MG/DL (ref 0–30)
WBC # BLD AUTO: 8.3 X10(3) UL (ref 4–11)

## 2023-04-20 PROCEDURE — 80053 COMPREHEN METABOLIC PANEL: CPT

## 2023-04-20 PROCEDURE — 36415 COLL VENOUS BLD VENIPUNCTURE: CPT

## 2023-04-20 PROCEDURE — 83036 HEMOGLOBIN GLYCOSYLATED A1C: CPT

## 2023-04-20 PROCEDURE — 84443 ASSAY THYROID STIM HORMONE: CPT

## 2023-04-20 PROCEDURE — 85025 COMPLETE CBC W/AUTO DIFF WBC: CPT

## 2023-04-20 PROCEDURE — 80061 LIPID PANEL: CPT

## 2023-10-03 ENCOUNTER — IMMUNIZATION (OUTPATIENT)
Dept: LAB | Age: 63
End: 2023-10-03
Attending: EMERGENCY MEDICINE
Payer: COMMERCIAL

## 2023-10-03 DIAGNOSIS — Z23 NEED FOR VACCINATION: Primary | ICD-10-CM

## 2023-10-03 PROCEDURE — 90480 ADMN SARSCOV2 VAC 1/ONLY CMP: CPT

## 2023-10-17 ENCOUNTER — NURSE ONLY (OUTPATIENT)
Dept: FAMILY MEDICINE CLINIC | Facility: CLINIC | Age: 63
End: 2023-10-17

## 2023-10-17 DIAGNOSIS — Z23 NEED FOR SHINGLES VACCINE: Primary | ICD-10-CM

## 2023-10-17 PROCEDURE — 90750 HZV VACC RECOMBINANT IM: CPT | Performed by: FAMILY MEDICINE

## 2023-10-17 PROCEDURE — 90471 IMMUNIZATION ADMIN: CPT | Performed by: FAMILY MEDICINE

## 2023-11-27 ENCOUNTER — OFFICE VISIT (OUTPATIENT)
Dept: FAMILY MEDICINE CLINIC | Facility: CLINIC | Age: 63
End: 2023-11-27

## 2023-11-27 ENCOUNTER — HOSPITAL ENCOUNTER (OUTPATIENT)
Dept: GENERAL RADIOLOGY | Age: 63
Discharge: HOME | End: 2023-11-27
Attending: FAMILY MEDICINE
Payer: COMMERCIAL

## 2023-11-27 VITALS
DIASTOLIC BLOOD PRESSURE: 70 MMHG | SYSTOLIC BLOOD PRESSURE: 130 MMHG | WEIGHT: 195 LBS | HEIGHT: 71 IN | BODY MASS INDEX: 27.3 KG/M2 | HEART RATE: 61 BPM

## 2023-11-27 DIAGNOSIS — M54.6 ACUTE THORACIC BACK PAIN, UNSPECIFIED BACK PAIN LATERALITY: Primary | ICD-10-CM

## 2023-11-27 DIAGNOSIS — M54.6 ACUTE THORACIC BACK PAIN, UNSPECIFIED BACK PAIN LATERALITY: ICD-10-CM

## 2023-11-27 PROCEDURE — 71046 X-RAY EXAM CHEST 2 VIEWS: CPT | Performed by: FAMILY MEDICINE

## 2023-11-27 PROCEDURE — 3078F DIAST BP <80 MM HG: CPT | Performed by: FAMILY MEDICINE

## 2023-11-27 PROCEDURE — 3008F BODY MASS INDEX DOCD: CPT | Performed by: FAMILY MEDICINE

## 2023-11-27 PROCEDURE — 99212 OFFICE O/P EST SF 10 MIN: CPT | Performed by: FAMILY MEDICINE

## 2023-11-27 PROCEDURE — 3075F SYST BP GE 130 - 139MM HG: CPT | Performed by: FAMILY MEDICINE

## 2023-11-27 RX ORDER — HYDROCORTISONE AND ACETIC ACID 20.75; 10.375 MG/ML; MG/ML
5 SOLUTION AURICULAR (OTIC) 3 TIMES DAILY
Qty: 1 EACH | Refills: 0 | Status: SHIPPED | OUTPATIENT
Start: 2023-11-27

## 2024-11-01 ENCOUNTER — OFFICE VISIT (OUTPATIENT)
Dept: FAMILY MEDICINE CLINIC | Facility: CLINIC | Age: 64
End: 2024-11-01

## 2024-11-01 VITALS
BODY MASS INDEX: 28.28 KG/M2 | HEART RATE: 56 BPM | SYSTOLIC BLOOD PRESSURE: 120 MMHG | DIASTOLIC BLOOD PRESSURE: 56 MMHG | WEIGHT: 202 LBS | HEIGHT: 71 IN

## 2024-11-01 DIAGNOSIS — R22.1 MASS OF RIGHT SIDE OF NECK: ICD-10-CM

## 2024-11-01 DIAGNOSIS — R73.01 IFG (IMPAIRED FASTING GLUCOSE): ICD-10-CM

## 2024-11-01 DIAGNOSIS — Z00.00 ROUTINE PHYSICAL EXAMINATION: Primary | ICD-10-CM

## 2024-11-01 DIAGNOSIS — R05.1 ACUTE COUGH: ICD-10-CM

## 2024-11-01 DIAGNOSIS — E78.00 PURE HYPERCHOLESTEROLEMIA: ICD-10-CM

## 2024-11-01 NOTE — PROGRESS NOTES
REASON FOR VISIT:    Jose Medrano is a 63 year old male who presents for an Annual Health Assessment.        Patient Active Problem List   Diagnosis    Routine physical examination    Chronic midline thoracic back pain    Pure hypercholesterolemia     General Health     At any time do you feel concerned for the safety/well-being of yourself and/or your children, in your home or elsewhere?: No     CAGE:           Depression Screening (PHQ-2/PHQ-9):  Over the LAST 2 WEEKS             PREVENTATIVE SERVICES  INDICATIONS AND SCHEDULE Recommendation Internal Lab or Procedure   Colonoscopy Screen Every 10 years Health Maintenance   Topic Date Due    Colorectal Cancer Screening  09/15/2027      Flex Sigmoidoscopy Screen  Every 5 years No results found for this or any previous visit.   Fecal Occult Blood  Annually No results found for: \"FOB\", \"OCCULTSTOOL\"   Obesity Screening Screen all adults annually Body mass index is 28.17 kg/m².     Preventive Services for Which Recommendations Vary with Risk Recommendation Internal Lab or Procedure   Cholesterol Screening Recommended screening varies with age, risk and gender LDL Cholesterol (mg/dL)   Date Value   04/20/2023 96      Diabetes Screening  If history of high blood pressure or other  risk factors HgbA1C (%)   Date Value   04/20/2023 5.7 (H)     Glucose (mg/dL)   Date Value   04/20/2023 105 (H)        Gonorrhea Screening If high risk No results found for: \"GONOCOCCUS\"   HIV Screening For all adults age 18-65, older adults at increased risk No results found for: \"HIV\"   Syphilis Screening Screen if pregnant or high risk No results found for: \"RPR\"   Hepatitis C Screening Screen pts at high risk plus screen one time for adults born 1945 - 1965 No results found for: \"HCVAB\"   Tuberculosis Screen If high risk No components found for: \"PPDINDURAT\"       SPECIFIC DISEASE MONITORING Internal Lab or Procedure   No disease specific diagnoses    ALLERGIES:   Allergies[1]  CURRENT  MEDICATIONS:   Current Outpatient Medications   Medication Sig Dispense Refill    Hydrocortisone-Acetic Acid 1-2 % Otic Solution Place 5 drops into the right ear 3 (three) times daily. 1 each 0    triamcinolone 0.1 % External Cream Apply 1 Application. topically 2 (two) times daily. (Patient not taking: Reported on 11/27/2023) 454 g 1      MEDICAL INFORMATION:   Past Medical History:    Colon adenoma    Disorder of thyroid    High cholesterol      Past Surgical History:   Procedure Laterality Date    Colonoscopy N/A 9/15/2020    Procedure: COLONOSCOPY;  Surgeon: Ankur Moe MD;  Location: [facility]      Family History   Problem Relation Age of Onset    Dementia Mother 86    Heart Attack Father 81    Cancer Brother 56        PANCREAS     NO FAMILY HISTORY OF PROSTATE OR COLON CANCER     SOCIAL HISTORY:   Social History     Socioeconomic History    Marital status:    Tobacco Use    Smoking status: Former     Types: Cigarettes    Smokeless tobacco: Never   Substance and Sexual Activity    Alcohol use: Yes     Alcohol/week: 0.0 standard drinks of alcohol     Comment: sometimes 1 beer a day    Drug use: No   Other Topics Concern    Caffeine Concern No    Outdoor occupation No    Reaction to local anesthetic No     Occ:  :       REVIEW OF SYSTEMS:   GENERAL: feels well otherwise  SKIN: denies any unusual skin lesions  EYES: denies blurred vision or double vision  HEENT: denies nasal congestion, sinus pain or ST  LUNGS: denies shortness of breath with exertion  CARDIOVASCULAR: denies chest pain on exertion  GI: denies abdominal pain, denies heartburn  : denies nocturia or changes in stream  MUSCULOSKELETAL: denies back pain  NEURO: denies headaches  PSYCHE: denies depression or anxiety  HEMATOLOGIC: denies hx of anemia  ENDOCRINE: denies thyroid history  ALL/ASTHMA: denies hx of allergy or asthma  NO BLOOD IN STOOL  EXAM:   /56   Pulse 56   Ht 5' 11\" (1.803 m)   Wt 202 lb (91.6 kg)   BMI  28.17 kg/m²   >   BP Readings from Last 3 Encounters:   11/01/24 120/56   11/27/23 130/70   04/19/23 120/76        GENERAL: well developed, well nourished, in no apparent distress   SKIN: no rashes, no suspicious lesions  HEENT: atraumatic, normocephalic, ears and throat are clear  EYES:PERRLA, EOMI, conjunctiva are clear.    NECK: supple, no adenopathy, no bruits palpable nodule SCM inferiorly  CHEST: no chest tenderness  LUNGS: clear to auscultation  CARDIO: RRR without murmur  GI: good BS's, no masses, HSM or tenderness  : two descended testes, no masses, no hernia, no penile lesions  RECTAL: deferred  MUSCULOSKELETAL: back is not tender, FROM of the back  EXTREMITIES: no cyanosis, clubbing or edema  NEURO: Oriented times three, cranial nerves are intact, motor and sensory are grossly intact         ASSESSMENT AND OTHER RELEVANT CHRONIC CONDITIONS:   Jose Medrano is a 63 year old male who presents for an Annual Health Assessment.     PLAN SUMMARY:   Diagnoses and all orders for this visit:    Routine physical examination    Pure hypercholesterolemia  -     CT CALCIUM SCORING; Future    IFG (impaired fasting glucose)  -     ALT(SGPT); Future  -     AST (SGOT); Future  -     Basic Metabolic Panel (8); Future  -     Lipid Panel; Future  -     PSA Total, Screen; Future  -     TSH W Reflex To Free T4; Future  -     Urinalysis with Culture Reflex  -     Hemoglobin A1C; Future    Mass of right side of neck  -     ENT - INTERNAL    Acute cough  -     XR CHEST PA + LAT CHEST (CPT=71046); Future    Other orders  -     Prevnar 20 (PCV20) [51595]       The patient indicates understanding of these issues and agrees to the plan.  No follow-ups on file.  Exercise counseling perfomed    SUGGESTED VACCINATIONS - Influenza, Pneumococcal, Zoster, Tetanus, HPV   Influenza: No recommendations at this time  Pneumonia: Pneumococcal Vaccination(1 of 1 - PCV) Never done  HPV: No recommendations at this time  Tdap: No  recommendations at this time  Shingles:      Influenza Annually   Pneumococcal if high risk   Td/Tdap once then every 10 years   HPV Males 11-21   Zoster (Shingles) 60 and older: one dose   Varicella 2 doses if not immune   MMR 1-2 doses if born after 1958 and not immune   1. Routine physical examination  Prevnar 20    2. Pure hypercholesterolemia  Labs ordered  - CT CALCIUM SCORING; Future    3. IFG (impaired fasting glucose)  Labs ordered  - ALT(SGPT); Future  - AST (SGOT); Future  - Basic Metabolic Panel (8); Future  - Lipid Panel; Future  - PSA Total, Screen; Future  - TSH W Reflex To Free T4; Future  - Urinalysis with Culture Reflex  - Hemoglobin A1C; Future    4. Mass of right side of neck  Near the SCM inferiorly  - ENT - INTERNAL    5. Acute cough  X-ray ordered  - XR CHEST PA + LAT CHEST (CPT=71046); Future           [1] No Known Allergies

## 2024-11-07 ENCOUNTER — HOSPITAL ENCOUNTER (OUTPATIENT)
Dept: GENERAL RADIOLOGY | Age: 64
Discharge: HOME | End: 2024-11-07
Attending: FAMILY MEDICINE
Payer: COMMERCIAL

## 2024-11-07 ENCOUNTER — LAB ENCOUNTER (OUTPATIENT)
Dept: LAB | Age: 64
End: 2024-11-07
Attending: FAMILY MEDICINE
Payer: COMMERCIAL

## 2024-11-07 DIAGNOSIS — R05.1 ACUTE COUGH: ICD-10-CM

## 2024-11-07 DIAGNOSIS — R73.01 IFG (IMPAIRED FASTING GLUCOSE): ICD-10-CM

## 2024-11-07 LAB
ALT SERPL-CCNC: 28 U/L
ANION GAP SERPL CALC-SCNC: 8 MMOL/L (ref 0–18)
AST SERPL-CCNC: 27 U/L (ref ?–34)
BILIRUB UR QL: NEGATIVE
BUN BLD-MCNC: 13 MG/DL (ref 9–23)
BUN/CREAT SERPL: 13.3 (ref 10–20)
CALCIUM BLD-MCNC: 10.3 MG/DL (ref 8.7–10.4)
CHLORIDE SERPL-SCNC: 108 MMOL/L (ref 98–112)
CHOLEST SERPL-MCNC: 236 MG/DL (ref ?–200)
CLARITY UR: CLEAR
CO2 SERPL-SCNC: 25 MMOL/L (ref 21–32)
COLOR UR: YELLOW
COMPLEXED PSA SERPL-MCNC: 0.59 NG/ML (ref ?–4)
CREAT BLD-MCNC: 0.98 MG/DL
EGFRCR SERPLBLD CKD-EPI 2021: 85 ML/MIN/1.73M2 (ref 60–?)
EST. AVERAGE GLUCOSE BLD GHB EST-MCNC: 117 MG/DL (ref 68–126)
FASTING PATIENT LIPID ANSWER: YES
FASTING STATUS PATIENT QL REPORTED: YES
GLUCOSE BLD-MCNC: 111 MG/DL (ref 70–99)
GLUCOSE UR-MCNC: NORMAL MG/DL
HBA1C MFR BLD: 5.7 % (ref ?–5.7)
HDLC SERPL-MCNC: 97 MG/DL (ref 40–59)
HGB UR QL STRIP.AUTO: NEGATIVE
KETONES UR-MCNC: NEGATIVE MG/DL
LDLC SERPL CALC-MCNC: 121 MG/DL (ref ?–100)
LEUKOCYTE ESTERASE UR QL STRIP.AUTO: NEGATIVE
NITRITE UR QL STRIP.AUTO: NEGATIVE
NONHDLC SERPL-MCNC: 139 MG/DL (ref ?–130)
OSMOLALITY SERPL CALC.SUM OF ELEC: 293 MOSM/KG (ref 275–295)
PH UR: 6 [PH] (ref 5–8)
POTASSIUM SERPL-SCNC: 4 MMOL/L (ref 3.5–5.1)
PROT UR-MCNC: NEGATIVE MG/DL
SODIUM SERPL-SCNC: 141 MMOL/L (ref 136–145)
SP GR UR STRIP: 1.02 (ref 1–1.03)
TRIGL SERPL-MCNC: 106 MG/DL (ref 30–149)
TSI SER-ACNC: 1.59 UIU/ML (ref 0.55–4.78)
UROBILINOGEN UR STRIP-ACNC: NORMAL
VLDLC SERPL CALC-MCNC: 19 MG/DL (ref 0–30)

## 2024-11-07 PROCEDURE — 80061 LIPID PANEL: CPT

## 2024-11-07 PROCEDURE — 84460 ALANINE AMINO (ALT) (SGPT): CPT

## 2024-11-07 PROCEDURE — 36415 COLL VENOUS BLD VENIPUNCTURE: CPT

## 2024-11-07 PROCEDURE — 81003 URINALYSIS AUTO W/O SCOPE: CPT | Performed by: FAMILY MEDICINE

## 2024-11-07 PROCEDURE — 83036 HEMOGLOBIN GLYCOSYLATED A1C: CPT

## 2024-11-07 PROCEDURE — 84443 ASSAY THYROID STIM HORMONE: CPT

## 2024-11-07 PROCEDURE — 80048 BASIC METABOLIC PNL TOTAL CA: CPT

## 2024-11-07 PROCEDURE — 71046 X-RAY EXAM CHEST 2 VIEWS: CPT | Performed by: FAMILY MEDICINE

## 2024-11-07 PROCEDURE — 84450 TRANSFERASE (AST) (SGOT): CPT

## 2024-11-11 ENCOUNTER — LAB ENCOUNTER (OUTPATIENT)
Dept: LAB | Age: 64
End: 2024-11-11
Attending: FAMILY MEDICINE

## 2024-11-11 ENCOUNTER — HOSPITAL ENCOUNTER (OUTPATIENT)
Dept: CT IMAGING | Age: 64
Discharge: HOME | End: 2024-11-11
Attending: FAMILY MEDICINE

## 2024-11-11 DIAGNOSIS — E78.00 PURE HYPERCHOLESTEROLEMIA: ICD-10-CM

## 2025-01-07 ENCOUNTER — TELEPHONE (OUTPATIENT)
Dept: FAMILY MEDICINE CLINIC | Facility: CLINIC | Age: 65
End: 2025-01-07

## 2025-02-07 ENCOUNTER — OFFICE VISIT (OUTPATIENT)
Dept: OTOLARYNGOLOGY | Facility: CLINIC | Age: 65
End: 2025-02-07
Payer: COMMERCIAL

## 2025-02-07 DIAGNOSIS — L72.0 EPIDERMAL CYST OF NECK: Primary | ICD-10-CM

## 2025-02-07 PROCEDURE — 99202 OFFICE O/P NEW SF 15 MIN: CPT | Performed by: OTOLARYNGOLOGY

## 2025-02-07 NOTE — PROGRESS NOTES
Jose Medrano is a 64 year old male.    Chief Complaint   Patient presents with    Bump     Patient is here due to bump on neck  x 4 months. Reports no pain.        HISTORY OF PRESENT ILLNESS    He presents with a 4-month history of noticing a small bump on the right side of his neck with a finding of a cystic structure beneath the skin.  Here for further evaluation management.  Not bothered by it does not cause him pain or discomfort.    Social History     Socioeconomic History    Marital status:    Tobacco Use    Smoking status: Former     Types: Cigarettes    Smokeless tobacco: Never   Vaping Use    Vaping status: Never Used   Substance and Sexual Activity    Alcohol use: Yes     Alcohol/week: 0.0 standard drinks of alcohol     Comment: sometimes 1 beer a day    Drug use: No   Other Topics Concern    Caffeine Concern No    Outdoor occupation No    Reaction to local anesthetic No       Family History   Problem Relation Age of Onset    Dementia Mother 86    Heart Attack Father 81    Cancer Brother 56        PANCREAS       Past Medical History:    Colon adenoma    Disorder of thyroid    High cholesterol       Past Surgical History:   Procedure Laterality Date    Colonoscopy N/A 9/15/2020    Procedure: COLONOSCOPY;  Surgeon: Ankur Moe MD;  Location: [facility]         REVIEW OF SYSTEMS    System Neg/Pos Details   Constitutional Negative Fatigue, fever and weight loss.   ENMT Negative Drooling.   Eyes Negative Blurred vision and vision changes.   Respiratory Negative Dyspnea and wheezing.   Cardio Negative Chest pain, irregular heartbeat/palpitations and syncope.   GI Negative Abdominal pain and diarrhea.   Endocrine Negative Cold intolerance and heat intolerance.   Neuro Negative Tremors.   Psych Negative Anxiety and depression.   Integumentary Negative Frequent skin infections, pigment change and rash.   Hema/Lymph Negative Easy bleeding and easy bruising.           PHYSICAL EXAM    There were no  vitals taken for this visit.       Constitutional Normal Overall appearance - Normal.   Psychiatric Normal Orientation - Oriented to time, place, person & situation. Appropriate mood and affect.   Neck Exam Normal Inspection - Normal. Palpation 1.5 cm subcutaneous cystic structure.  Right side parotid gland - Normal. Thyroid gland - Normal.   Eyes Normal Conjunctiva - Right: Normal, Left: Normal. Pupil - Right: Normal, Left: Normal. Fundus - Right: Normal, Left: Normal.   Neurological Normal Memory - Normal. Cranial nerves - Cranial nerves II through XII grossly intact.   Head/Face Normal Facial features - Normal. Eyebrows - Normal. Skull - Normal.        Nasopharynx Normal External nose - Normal. Lips/teeth/gums - Normal. Tonsils - Normal. Oropharynx - Normal.   Ears Normal Inspection - Right: Normal, Left: Normal. Canal - Right: Normal, Left: Normal. TM - Right: Normal, Left: Normal.   Skin Normal Inspection - Normal.        Lymph Detail Normal Submental. Submandibular. Anterior cervical. Posterior cervical. Supraclavicular.        Nose/Mouth/Throat Normal External nose - Normal. Lips/teeth/gums - Normal. Tonsils - Normal. Oropharynx - Normal.   Nose/Mouth/Throat Normal Nares - Right: Normal Left: Normal. Septum -Normal  Turbinates - Right: Normal, Left: Normal.       Current Outpatient Medications:     Hydrocortisone-Acetic Acid 1-2 % Otic Solution, Place 5 drops into the right ear 3 (three) times daily., Disp: 1 each, Rfl: 0    triamcinolone 0.1 % External Cream, Apply 1 Application. topically 2 (two) times daily., Disp: 454 g, Rfl: 1  ASSESSMENT AND PLAN    1. Epidermal cyst of neck  1.5 cm neck cyst right lower neck.  We discussed watchful waiting versus excision.  At this time he is leaning towards watchful waiting but will return to see me if he is of the structure.        This note was prepared using Dragon Medical voice recognition dictation software. As a result errors may occur. When identified these  errors have been corrected. While every attempt is made to correct errors during dictation discrepancies may still exist    Kenneth Saldana MD    2/7/2025    5:00 PM

## 2025-05-09 ENCOUNTER — OFFICE VISIT (OUTPATIENT)
Dept: FAMILY MEDICINE CLINIC | Facility: CLINIC | Age: 65
End: 2025-05-09

## 2025-05-09 VITALS
SYSTOLIC BLOOD PRESSURE: 132 MMHG | WEIGHT: 202 LBS | HEART RATE: 57 BPM | HEIGHT: 71 IN | BODY MASS INDEX: 28.28 KG/M2 | DIASTOLIC BLOOD PRESSURE: 86 MMHG

## 2025-05-09 DIAGNOSIS — M25.512 ACUTE PAIN OF LEFT SHOULDER: Primary | ICD-10-CM

## 2025-05-09 PROCEDURE — 99213 OFFICE O/P EST LOW 20 MIN: CPT | Performed by: FAMILY MEDICINE

## 2025-05-09 RX ORDER — HYDROCORTISONE AND ACETIC ACID 20.75; 10.375 MG/ML; MG/ML
4 SOLUTION AURICULAR (OTIC) 4 TIMES DAILY
Qty: 1 EACH | Refills: 0 | Status: SHIPPED | OUTPATIENT
Start: 2025-05-09

## 2025-05-09 RX ORDER — TRIAMCINOLONE ACETONIDE 40 MG/ML
40 INJECTION, SUSPENSION INTRA-ARTICULAR; INTRAMUSCULAR ONCE
Status: SHIPPED | OUTPATIENT
Start: 2025-05-09

## 2025-05-09 NOTE — PROGRESS NOTES
Blood pressure 132/86, pulse 57, height 5' 11\" (1.803 m), weight 202 lb (91.6 kg).  COMPLAINING OF 2 MONTHS OF LEFT SHOULDER PAIN NO INJURY .      OBJECTIVE    NEGATIVE SPURLING TEST B/L     LEFT SHOULDER WITH POSITIVE ERMIAS'S TEST     TENDERNESS AT BICIPITAL TUBERCLE  SOME DIFFICULTY WITH ADDUCTION AND ABDUCTION    POSITIVE FOR DOMINGO IMPINGEMENT    ASSESSMENT ROTATOR CUFF INJURY WITH BICIPITAL TENDONITIS    PLAN CORTICOSTEROID INJECTION

## 2025-05-09 NOTE — PROCEDURES
INFORMED CONSENT OBTAINED ALL QUESTIONS ANSWERED    CORTICOSTEROID INJECTION LEFT SHOULDER ASEPTIC TECHNIQUE    LIDOCAINE 1% X 4 ML    KENALOG 40MG/ML X 1 ML    TOLERATED WELL

## 2025-05-22 ENCOUNTER — TELEPHONE (OUTPATIENT)
Dept: FAMILY MEDICINE CLINIC | Facility: CLINIC | Age: 65
End: 2025-05-22

## 2025-05-22 ENCOUNTER — HOSPITAL ENCOUNTER (OUTPATIENT)
Dept: GENERAL RADIOLOGY | Age: 65
Discharge: HOME | End: 2025-05-22
Attending: FAMILY MEDICINE
Payer: COMMERCIAL

## 2025-05-22 ENCOUNTER — TELEPHONE (OUTPATIENT)
Dept: ORTHOPEDICS CLINIC | Facility: CLINIC | Age: 65
End: 2025-05-22

## 2025-05-22 DIAGNOSIS — M25.512 ACUTE PAIN OF LEFT SHOULDER: Primary | ICD-10-CM

## 2025-05-22 DIAGNOSIS — M25.512 ACUTE PAIN OF LEFT SHOULDER: ICD-10-CM

## 2025-05-22 PROCEDURE — 73030 X-RAY EXAM OF SHOULDER: CPT | Performed by: FAMILY MEDICINE

## 2025-05-22 NOTE — TELEPHONE ENCOUNTER
Patient is scheduled for left shoulder pain. Getting an xray today 5/22. Please advise if additional imaging is needed.  Future Appointments   Date Time Provider Department Center   5/29/2025  9:00 AM Radha Reed MD [id number]

## 2025-05-23 ENCOUNTER — APPOINTMENT (OUTPATIENT)
Dept: OTHER | Facility: HOSPITAL | Age: 65
End: 2025-05-23
Attending: FAMILY MEDICINE

## 2025-06-03 ENCOUNTER — OFFICE VISIT (OUTPATIENT)
Dept: OTHER | Facility: HOSPITAL | Age: 65
End: 2025-06-03
Attending: FAMILY MEDICINE

## 2025-06-03 DIAGNOSIS — S49.92XD INJURY OF LEFT SHOULDER, SUBSEQUENT ENCOUNTER: Primary | ICD-10-CM

## 2025-06-11 ENCOUNTER — HOSPITAL ENCOUNTER (OUTPATIENT)
Dept: MRI IMAGING | Age: 65
Discharge: HOME | End: 2025-06-11
Attending: FAMILY MEDICINE
Payer: OTHER MISCELLANEOUS

## 2025-06-11 DIAGNOSIS — S49.92XD INJURY OF LEFT SHOULDER, SUBSEQUENT ENCOUNTER: ICD-10-CM

## 2025-06-11 PROCEDURE — 73221 MRI JOINT UPR EXTREM W/O DYE: CPT | Performed by: FAMILY MEDICINE

## 2025-06-18 ENCOUNTER — APPOINTMENT (OUTPATIENT)
Dept: OTHER | Facility: HOSPITAL | Age: 65
End: 2025-06-18
Attending: FAMILY MEDICINE

## 2025-06-20 ENCOUNTER — ORDER TRANSCRIPTION (OUTPATIENT)
Dept: PHYSICAL THERAPY | Facility: HOSPITAL | Age: 65
End: 2025-06-20

## 2025-06-20 DIAGNOSIS — M75.122 COMPLETE ROTATOR CUFF TEAR OF LEFT SHOULDER: Primary | ICD-10-CM

## 2025-06-23 ENCOUNTER — TELEPHONE (OUTPATIENT)
Dept: PHYSICAL THERAPY | Facility: HOSPITAL | Age: 65
End: 2025-06-23

## 2025-06-24 ENCOUNTER — OFFICE VISIT (OUTPATIENT)
Dept: PHYSICAL THERAPY | Age: 65
End: 2025-06-24
Attending: ORTHOPAEDIC SURGERY
Payer: OTHER MISCELLANEOUS

## 2025-06-24 DIAGNOSIS — M75.122 COMPLETE ROTATOR CUFF TEAR OF LEFT SHOULDER: Primary | ICD-10-CM

## 2025-06-24 PROCEDURE — 97161 PT EVAL LOW COMPLEX 20 MIN: CPT | Performed by: PHYSICAL THERAPIST

## 2025-06-24 PROCEDURE — 97110 THERAPEUTIC EXERCISES: CPT | Performed by: PHYSICAL THERAPIST

## 2025-06-24 NOTE — PROGRESS NOTES
UPPER EXTREMITY EVALUATION:     Diagnosis:   Complete rotator cuff tear of left shoulder (M75.122) Patient:  Jose Medrano (64 year old, male)        Referring Provider: Maicol Bird  Today's Date   6/24/2025    Precautions:  None   Date of Evaluation: 06/24/25  Next MD visit: No data recorded  Date of Surgery: NA     PATIENT SUMMARY     Summary of chief complaints: weakness with lifting and reaching out and up  History of current condition: Fell 5/22/25 when foot caught on pipe at work when turning.  Fell and weight went through both arms, L > R.  Did no fall to floor.   Pain level: current 1 /10, at best 1 /10, at worst 5 /10  Description of symptoms: ache,   Occupation: Facilities work   Leisure activities/Hobbies:     Prior level of function: no problems with moving arm or lifting  Current limitations: lifting, reaching overhead, reaching to side  Pt goals: 1. Get shoulder stronger, minimal to no pain.  Able to do lifting  Hand Dominance: right   Past medical history was reviewed with Jose.    Imaging/Tests:     Jose  has a past medical history of Colon adenoma (09/15/2020), Disorder of thyroid, and High cholesterol.  He  has a past surgical history that includes colonoscopy (N/A, 9/15/2020).    ASSESSMENT  Jose presents to physical therapy evaluation with primary c/o weakness with lifting and reaching out and up. The results of the objective tests and measures show impaired strength flexion 4 minus but most noted with ER 2+/5, no AROM loss with flexion/abduction or IR, passive ER equal but unable to do active ER, forward head/shoulder positioning and c/o pain. Functional deficits include but are not limited to lifting, reaching overhead, reaching to side. Signs and symptoms are consistent with diagnosis of Complete rotator cuff tear of left shoulder (M75.122). Pt and PT discussed evaluation findings, pathology, POC and HEP.  Pt voiced understanding and performs HEP correctly without reported pain.  Skilled Physical Therapy is medically necessary to address the above impairments and reach functional goals.    OBJECTIVE:      Musculoskeletal  Observation/Posture: forward head posture; scapula abduction; shoulder internal rotation  Palpation:  slight tenderness anterior shoulder L     Special Tests:  (+) impingement L     ROM and Strength (* denotes performed with pain)  Shoulder   ROM MMT (-/5)    R L R L     Flex 155 155 5/5 4 minus/5 with pain     Ext 75 50 5/5 5/5     Abd (C5) 150 152 5/5 5/5     IR lower thoracic lower thoraicic 5/5 5/5     ER 80  80 passive 5/5 2+/5 with pain     Low Trap n/a         Mid Trap n/a         SA n/a             Neurological:  Sensation:   intact     Today's Treatment and Response:   Pt education was provided on exam findings, treatment diagnosis, treatment plan, expectations, and prognosis.    Today's Treatment       6/24/2025   UE Treatment   Therapeutic Exercise Minutes 15   Evaluation Minutes 30   Total Time Of Timed Procedures 15   Total Time Of Service-Based Procedures 30   Total Treatment Time 45   HEP Standing active IR/ER  Rows RTB  IR YTB  Scapular retractions        Patient was instructed in and issued a HEP for: Standing active IR/ER  Rows RTB  IR YTB  Scapular retractions    Charges:  PT EVAL: Low Complexity,    In agreement with evaluation findings and clinical rationale, this evaluation involved LOW COMPLEXITY decision making due to no personal factors/comorbidities, 1-2 body structures involved/activity limitations, and stable symptoms as documented in the evaluation.                                                          PLAN OF CARE:      Goals: (to be met in 10 visits)   1/ Patient independent with HEP  2/  Patient to have increase in strength by 1/2 mm grade or greater with ER and flexion to be able to reach up and to side easier  3/ Patient to report pain levels at higher 3/10 with daily activities.        Frequency / Duration: Patient will be seen  1-2x/week or a total of 10  visits over a 90 day period. Treatment will include: Therapeutic Exercise; Home Exercise Program instruction    Education or treatment limitation: None   Rehab Potential: fair to good-   QuickDASH Outcome Score  Score: (Patient-Rptd) 52.27 % (6/24/2025  3:23 PM)      Patient was advised of these findings, precautions, and treatment options and has agreed to actively participate in planning and for this course of care.    Thank you for your referral. Please co-sign or sign and return this letter via fax as soon as possible to [phone]. If you have any questions, please contact me at Dept: [phone]    Sincerely,  Electronically signed by therapist: Siobhan Cornejo PT  Physician's certification required: Yes  I certify the need for these services furnished under this plan of treatment and while under my care.    X___________________________________________________ Date____________________    Certification From: 6/24/2025  To: 9/22/2025

## 2025-06-26 ENCOUNTER — OFFICE VISIT (OUTPATIENT)
Dept: PHYSICAL THERAPY | Age: 65
End: 2025-06-26
Payer: OTHER MISCELLANEOUS

## 2025-06-26 PROCEDURE — 97110 THERAPEUTIC EXERCISES: CPT | Performed by: PHYSICAL THERAPIST

## 2025-06-26 NOTE — PROGRESS NOTES
Patient: Jose Medrano (64 year old, male) Referring Provider:  Insurance:   Diagnosis: Complete rotator cuff tear of left shoulder (M75.122) Maicol HOLLOWAY COMP   Date of Surgery: NA Next MD visit:  N/A   Precautions:  None No data recorded Referral Information:    Date of Evaluation: Req: 10, Auth: 10, Exp: 6/20/2026 06/24/25 POC Auth Visits:  10       Today's Date   6/26/2025    Subjective   Patient reports a little pain at shoulder       Pain: 2/10     Objective  see outpatient daily record.          Assessment   Able to add prone exercises for shoulder ext/mid trap, added standing isometric holds for ER L with YTB and added push ups.  Patient without any c/o pain during session or after.     Goals (to be met in 10 visits)   1/ Patient independent with HEP  2/  Patient to have increase in strength by 1/2 mm grade or greater with ER and flexion to be able to reach up and to side easier  3/ Patient to report pain levels at higher 3/10 with daily activities.            Plan   Continue work on strengthening/stabilization.  Advance as able.    Treatment Last 4 Visits  Treatment Day: 2       6/24/2025 6/26/2025   UE Treatment   Therapeutic Exercise  UBE 2 min frwd, 2 min back 4.0 resistance  Sitting - isometrics - Flex/Ext/Abd, ER/IR- x 10 each, 3 sec hold, sub max.  Side AA isometric hold ER x 8 3 sec  Side abduction  x 15 to 90 deg  Standing active IR/ER motion  Prone mid trap 2 x 10 2#  Prone shoulder ext 2 x 15 2#  Rows black tubing x 20  IR YTB 2 x 15  Standing isometric ER L with R movement x 10, hold 3 sec  Scapular retractions x 15   Therapeutic Exercise Minutes 15 40   Evaluation Minutes 30    Total Time Of Timed Procedures 15 40   Total Time Of Service-Based Procedures 30 0   Total Treatment Time 45 40   HEP Standing active IR/ER  Rows RTB  IR YTB  Scapular retractions         HEP  Prone mid trap  Prone extension  Wall push up  Isometric hold L ER against YTB    Charges   Ex 3

## 2025-07-01 ENCOUNTER — OFFICE VISIT (OUTPATIENT)
Dept: PHYSICAL THERAPY | Age: 65
End: 2025-07-01
Payer: OTHER MISCELLANEOUS

## 2025-07-01 PROCEDURE — 97110 THERAPEUTIC EXERCISES: CPT | Performed by: PHYSICAL THERAPIST

## 2025-07-01 NOTE — PROGRESS NOTES
Patient: Jose Medrano (64 year old, male) Referring Provider:  Insurance:   Diagnosis: Complete rotator cuff tear of left shoulder (M75.122) Maicol HOLLOWAY COMP   Date of Surgery: NA Next MD visit:  N/A   Precautions:  None No data recorded Referral Information:    Date of Evaluation: Req: 10, Auth: 10, Exp: 6/20/2026 06/24/25 POC Auth Visits:  10       Today's Date   7/1/2025    Subjective  Patient reports has been feeling better.  Just one exercise with some pain.       Pain: 0/10     Objective  see outpatient daily record.          Assessment   Added serratus punches, IR with blue band, rows prone in place of band rows, side abd 0-90 for home.  Reviewed other exercises for home, patient performing correctly.  Still with most weakness noted with ER.  Patient performing wall push ups with arms too high on the wall.       Goals (to be met in 10 visits)   1/ Patient independent with HEP  2/  Patient to have increase in strength by 1/2 mm grade or greater with ER and flexion to be able to reach up and to side easier  3/ Patient to report pain levels at higher 3/10 with daily activities.                Plan   Continue strengthening program ,advance as able.     Treatment Last 4 Visits  Treatment Day: 3       6/24/2025 6/26/2025 7/1/2025   UE Treatment   Therapeutic Exercise  UBE 2 min frwd, 2 min back 4.0 resistance  Sitting - isometrics - Flex/Ext/Abd, ER/IR- x 10 each, 3 sec hold, sub max.  Side AA isometric hold ER x 8 3 sec  Side abduction  x 15 to 90 deg  Standing active IR/ER motion  Prone mid trap 2 x 10 2#  Prone shoulder ext 2 x 15 2#  Rows black tubing x 20  IR YTB 2 x 15  Standing isometric ER L with R movement x 10, hold 3 sec  Scapular retractions x 15 UBE 21/2 min frwd, 2 1/2 min back 3.3 resistance  Sitting - isometrics - Flex/Ext/Abd, ER/IR- x 10 each, 3 sec hold, sub max.  Serratus anterior punches 2# 2 x 10  Side lying AA to ER,  control descent  Side lying  abduction  x 15 to 90 deg  Prone  mid trap 2 x 10 2#  Prone shoulder ext 2 x 10 3#  Prone rows 2 x 10 5#  IR BlueTB 2 x 10  Standing isometric ER L with R movement x 10, hold 3 sec YTB  Push ups x 15   Therapeutic Exercise Minutes 15 40 40   Evaluation Minutes 30     Total Time Of Timed Procedures 15 40 40   Total Time Of Service-Based Procedures 30 0 0   Total Treatment Time 45 40 40   HEP Standing active IR/ER  Rows RTB  IR YTB  Scapular retractions  Serratus anterior  Prone rows  IR blue band  Side abd to 90        HEP  Serratus anterior  Prone rows  IR blue band  Side abd to 90    Charges

## 2025-07-03 ENCOUNTER — OFFICE VISIT (OUTPATIENT)
Dept: PHYSICAL THERAPY | Age: 65
End: 2025-07-03
Payer: OTHER MISCELLANEOUS

## 2025-07-03 PROCEDURE — 97110 THERAPEUTIC EXERCISES: CPT | Performed by: PHYSICAL THERAPIST

## 2025-07-03 NOTE — PROGRESS NOTES
Patient: Jose Medrano (64 year old, male) Referring Provider:  Insurance:   Diagnosis: Complete rotator cuff tear of left shoulder (M75.122) Maicol HOLLOWAY COMP   Date of Surgery: NA Next MD visit:  N/A   Precautions:  None No data recorded Referral Information:    Date of Evaluation: Req: 10, Auth: 10, Exp: 6/20/2026 06/24/25 POC Auth Visits:  10       Today's Date   7/3/2025    Subjective   Patient reports the arm is feeling better and stronger.        Pain:  0/10     Objective     See outpatient daily record.      Assessment   Patient able to add a few more reps and added weight with side abd to 90, increase to 4\" for serratus punches.  Increased reps to 2 x 12.  Patient without complaints end of session.  Continues with most limitation for AROM and strength with ER    Goals (to be met in 10 visits)   1/ Patient independent with HEP  2/  Patient to have increase in strength by 1/2 mm grade or greater with ER and flexion to be able to reach up and to side easier  3/ Patient to report pain levels at higher 3/10 with daily activities.                    Plan   Continue strengthening/stabilization work.     Treatment Last 4 Visits  Treatment Day:  4       6/24/2025 6/26/2025 7/1/2025 7/3/2025   UE Treatment   Therapeutic Exercise  UBE 2 min frwd, 2 min back 4.0 resistance  Sitting - isometrics - Flex/Ext/Abd, ER/IR- x 10 each, 3 sec hold, sub max.  Side AA isometric hold ER x 8 3 sec  Side abduction  x 15 to 90 deg  Standing active IR/ER motion  Prone mid trap 2 x 10 2#  Prone shoulder ext 2 x 15 2#  Rows black tubing x 20  IR YTB 2 x 15  Standing isometric ER L with R movement x 10, hold 3 sec  Scapular retractions x 15 UBE 21/2 min frwd, 2 1/2 min back 3.3 resistance  Sitting - isometrics - Flex/Ext/Abd, ER/IR- x 10 each, 3 sec hold, sub max.  Serratus anterior punches 2# 2 x 10  Side lying AA to ER,  control descent  Side lying  abduction  x 15 to 90 deg  Prone mid trap 2 x 10 2#  Prone shoulder ext 2 x  10 3#  Prone rows 2 x 10 5#  IR BlueTB 2 x 10  Standing isometric ER L with R movement x 10, hold 3 sec YTB  Push ups x 15 UBE 21/2 min frwd, 2 1/2 min back 3.3 resistance  Sitting - isometrics - Flex/Ext/Abd, ER/IR- x 10 each, 3 sec hold, sub max.  Serratus anterior punches 4# 2 x 12  Side lying AA to ER,  control descent x 8  Side lying  abduction  2 x 12 to 90 deg 1#  Prone mid trap 2 x 12 2#  Prone shoulder ext 2 x 12 3#  Prone rows 2 x 12 5#  IR BlueTB 2 x 15  Standing isometric ER L with R movement x 10, hold 3 sec YTB  Push ups x 15 reps    Therapeutic Exercise Minutes 15 40 40 42   Evaluation Minutes 30      Total Time Of Timed Procedures 15 40 40 42   Total Time Of Service-Based Procedures 30 0 0 0   Total Treatment Time 45 40 40 42   HEP Standing active IR/ER  Rows RTB  IR YTB  Scapular retractions  Serratus anterior  Prone rows  IR blue band  Side abd to 90         HEP  No changes    Charges   Ex 3

## 2025-07-07 ENCOUNTER — OFFICE VISIT (OUTPATIENT)
Dept: PHYSICAL THERAPY | Age: 65
End: 2025-07-07
Payer: OTHER MISCELLANEOUS

## 2025-07-07 PROCEDURE — 97110 THERAPEUTIC EXERCISES: CPT | Performed by: PHYSICAL THERAPIST

## 2025-07-07 NOTE — PROGRESS NOTES
Patient: Jose Medrano (64 year old, male) Referring Provider:  Insurance:   Diagnosis: Complete rotator cuff tear of left shoulder (M75.122) Maicol HOLLOWAY COMP   Date of Surgery: NA Next MD visit:  N/A   Precautions:  None No data recorded Referral Information:    Date of Evaluation: Req: 10, Auth: 10, Exp: 6/20/2026 06/24/25 POC Auth Visits:  10       Today's Date   7/7/2025    Subjective   Patient reports mild pain today.  States still not lifting heavy, does feel some discomfort if pushing against something.       Pain: 1/10     Objective  see outpatient daily record.            Assessment   Increased reps with most of exercises this session and patient able to complete. .  Still noted weakness into ER .      Goals (to be met in 10 visits)   1/ Patient independent with HEP  2/  Patient to have increase in strength by 1/2 mm grade or greater with ER and flexion to be able to reach up and to side easier  3/ Patient to report pain levels at higher 3/10 with daily activities.                        Plan       Treatment Last 4 Visits  Treatment Day: 4       6/26/2025 7/1/2025 7/3/2025 7/7/2025   UE Treatment   Therapeutic Exercise UBE 2 min frwd, 2 min back 4.0 resistance  Sitting - isometrics - Flex/Ext/Abd, ER/IR- x 10 each, 3 sec hold, sub max.  Side AA isometric hold ER x 8 3 sec  Side abduction  x 15 to 90 deg  Standing active IR/ER motion  Prone mid trap 2 x 10 2#  Prone shoulder ext 2 x 15 2#  Rows black tubing x 20  IR YTB 2 x 15  Standing isometric ER L with R movement x 10, hold 3 sec  Scapular retractions x 15 UBE 21/2 min frwd, 2 1/2 min back 3.3 resistance  Sitting - isometrics - Flex/Ext/Abd, ER/IR- x 10 each, 3 sec hold, sub max.  Serratus anterior punches 2# 2 x 10  Side lying AA to ER,  control descent  Side lying  abduction  x 15 to 90 deg  Prone mid trap 2 x 10 2#  Prone shoulder ext 2 x 10 3#  Prone rows 2 x 10 5#  IR BlueTB 2 x 10  Standing isometric ER L with R movement x 10, hold 3  sec YTB  Push ups x 15 UBE 21/2 min frwd, 2 1/2 min back 3.3 resistance  Sitting - isometrics - Flex/Ext/Abd, ER/IR- x 10 each, 3 sec hold, sub max.  Serratus anterior punches 4# 2 x 12  Side lying AA to ER,  control descent x 8  Side lying  abduction  2 x 12 to 90 deg 1#  Prone mid trap 2 x 12 2#  Prone shoulder ext 2 x 12 3#  Prone rows 2 x 12 5#  IR BlueTB 2 x 15  Standing isometric ER L with R movement x 10, hold 3 sec YTB  Push ups x 15 reps  UBE 21/2 min frwd, 2 1/2 min back 4.1 resistance    Serratus anterior punches 4# 2 x 15  Side lying  abduction  2 x 15 to 90 deg 1#  Prone mid trap 2 x 12 2#  Prone shoulder ext 2 x 15 3#  Prone rows 2 x 20 5#  Side lying AA to ER,  control descent x 8  Sitting - isometrics - Flex/Ext/Abd, ER/IR- x 10 each, 3 sec hold, sub max.  IR BlueTB 2 x 15  Standing isometric ER L with R movement x 10, hold 3 sec YTB  Push ups x 15 reps    Therapeutic Exercise Minutes 40 40 42 40   Total Time Of Timed Procedures 40 40 42 40   Total Time Of Service-Based Procedures 0 0 0 0   Total Treatment Time 40 40 42 40   HEP  Serratus anterior  Prone rows  IR blue band  Side abd to 90          HEP  No changes    Charges      Ex 3

## 2025-07-15 ENCOUNTER — OFFICE VISIT (OUTPATIENT)
Dept: PHYSICAL THERAPY | Age: 65
End: 2025-07-15
Payer: OTHER MISCELLANEOUS

## 2025-07-15 PROCEDURE — 97110 THERAPEUTIC EXERCISES: CPT | Performed by: PHYSICAL THERAPIST

## 2025-07-15 NOTE — PROGRESS NOTES
Patient: Jose Medrano (64 year old, male) Referring Provider:  Insurance:   Diagnosis: Complete rotator cuff tear of left shoulder (M75.122) [facility]   Date of Surgery: NA Next MD visit:  N/A   Precautions:  None No data recorded Referral Information:    Date of Evaluation: Req: 10, Auth: 10, Exp: 6/20/2026 06/24/25 POC Auth Visits:  10       Today's Date   7/15/2025    Subjective  Patient reports just returning from vacation, shoulder feels ok.       Pain: 0/10     Objective  see outpatient daily record.          Assessment   Added shoulder flexion and abduction this session.  Unable to do any weight with abduction, 1# with flexion.  Continues with inability to actively move into ER.  Continuing to work on scapular strengthening.      Goals (to be met in 10 visits)   1/ Patient independent with HEP  2/  Patient to have increase in strength by 1/2 mm grade or greater with ER and flexion to be able to reach up and to side easier  3/ Patient to report pain levels at higher 3/10 with daily activities.                            Plan   Continue to work on strengthening, stabilization, advance as able.    Treatment Last 4 Visits  Treatment Day: 5       7/1/2025 7/3/2025 7/7/2025 7/15/2025   UE Treatment   Therapeutic Exercise UBE 21/2 min frwd, 2 1/2 min back 3.3 resistance  Sitting - isometrics - Flex/Ext/Abd, ER/IR- x 10 each, 3 sec hold, sub max.  Serratus anterior punches 2# 2 x 10  Side lying AA to ER,  control descent  Side lying  abduction  x 15 to 90 deg  Prone mid trap 2 x 10 2#  Prone shoulder ext 2 x 10 3#  Prone rows 2 x 10 5#  IR BlueTB 2 x 10  Standing isometric ER L with R movement x 10, hold 3 sec YTB  Push ups x 15 UBE 21/2 min frwd, 2 1/2 min back 3.3 resistance  Sitting - isometrics - Flex/Ext/Abd, ER/IR- x 10 each, 3 sec hold, sub max.  Serratus anterior punches 4# 2 x 12  Side lying AA to ER,  control descent x 8  Side lying  abduction  2 x 12 to 90 deg 1#  Prone mid trap 2 x 12  2#  Prone shoulder ext 2 x 12 3#  Prone rows 2 x 12 5#  IR BlueTB 2 x 15  Standing isometric ER L with R movement x 10, hold 3 sec YTB  Push ups x 15 reps  UBE 21/2 min frwd, 2 1/2 min back 4.1 resistance    Serratus anterior punches 4# 2 x 15  Side lying  abduction  2 x 15 to 90 deg 1#  Prone mid trap 2 x 12 2#  Prone shoulder ext 2 x 15 3#  Prone rows 2 x 20 5#  Side lying AA to ER,  control descent x 8  Sitting - isometrics - Flex/Ext/Abd, ER/IR- x 10 each, 3 sec hold, sub max.  IR BlueTB 2 x 15  Standing isometric ER L with R movement x 10, hold 3 sec YTB  Push ups x 15 reps  UBE 21/2 min frwd, 2 1/2 min back 4.1 resistance    Serratus anterior punches 5# 2 x 15  Side lying  abduction  2 x 15 to 90 deg 1#  Prone mid trap 2 x 12 2#  Prone shoulder ext 2 x 15 3 1/2#  Prone rows 2 x 20 5#  Side lying AA to ER,  control descent x 8  Sitting - isometrics - Flex/Ext/Abd, ER/IR- x 10 each, 3 sec hold, sub max.  Standing flexion x 10 1#   Standing abduction x 10 no weight  IR BlueTB 2 x 15  Standing isometric ER L with R movement x 10, hold 3 sec YTB  Standing flexion 1# x 10  Standing abduction no weight x 10  Push ups x 15 reps - not completed.   Therapeutic Exercise Minutes 40 42 40 42   Total Time Of Timed Procedures 40 42 40 42   Total Time Of Service-Based Procedures 0 0 0 0   Total Treatment Time 40 42 40 42   HEP Serratus anterior  Prone rows  IR blue band  Side abd to 90           HEP  Serratus anterior 5#  Prone rows 5#  IR blue band  Side abd to 90 1#  Isometric ER YTB  Prone mid trap 2#  Wall push ups  Shoulder flexion1#  Shoulder abduction no weight    Charges   Ex 3

## 2025-07-17 ENCOUNTER — APPOINTMENT (OUTPATIENT)
Dept: PHYSICAL THERAPY | Age: 65
End: 2025-07-17
Attending: FAMILY MEDICINE
Payer: OTHER MISCELLANEOUS

## 2025-07-17 ENCOUNTER — OFFICE VISIT (OUTPATIENT)
Dept: PHYSICAL THERAPY | Age: 65
End: 2025-07-17
Payer: OTHER MISCELLANEOUS

## 2025-07-17 PROCEDURE — 97110 THERAPEUTIC EXERCISES: CPT | Performed by: PHYSICAL THERAPIST

## 2025-07-17 NOTE — PROGRESS NOTES
Patient: Jose Medrano (64 year old, male) Referring Provider:  Insurance:   Diagnosis: Complete rotator cuff tear of left shoulder (M75.122) [facility]   Date of Surgery: NA Next MD visit:  N/A   Precautions:  None No data recorded Referral Information:    Date of Evaluation: Req: 10, Auth: 10, Exp: 6/20/2026 06/24/25 POC Auth Visits:  10       Today's Date   7/17/2025    Subjective  Patient reports light pain, 1/10       Pain: 1/10     Objective  see outpatient daily record.          Assessment     Continued strengthening work, added bicep curls this session.  Patient with fatigue end of session, slight increase in pain.  Goals (to be met in 10 visits)   1/ Patient independent with HEP  2/  Patient to have increase in strength by 1/2 mm grade or greater with ER and flexion to be able to reach up and to side easier  3/ Patient to report pain levels at higher 3/10 with daily activities.                                Plan  Continue work on stabilization, strengthening.    Treatment Last 4 Visits  Treatment Day: 6       7/3/2025 7/7/2025 7/15/2025 7/17/2025   UE Treatment   Therapeutic Exercise UBE 21/2 min frwd, 2 1/2 min back 3.3 resistance  Sitting - isometrics - Flex/Ext/Abd, ER/IR- x 10 each, 3 sec hold, sub max.  Serratus anterior punches 4# 2 x 12  Side lying AA to ER,  control descent x 8  Side lying  abduction  2 x 12 to 90 deg 1#  Prone mid trap 2 x 12 2#  Prone shoulder ext 2 x 12 3#  Prone rows 2 x 12 5#  IR BlueTB 2 x 15  Standing isometric ER L with R movement x 10, hold 3 sec YTB  Push ups x 15 reps  UBE 21/2 min frwd, 2 1/2 min back 4.1 resistance    Serratus anterior punches 4# 2 x 15  Side lying  abduction  2 x 15 to 90 deg 1#  Prone mid trap 2 x 12 2#  Prone shoulder ext 2 x 15 3#  Prone rows 2 x 20 5#  Side lying AA to ER,  control descent x 8  Sitting - isometrics - Flex/Ext/Abd, ER/IR- x 10 each, 3 sec hold, sub max.  IR BlueTB 2 x 15  Standing isometric ER L with R movement x  10, hold 3 sec YTB  Push ups x 15 reps  UBE 21/2 min frwd, 2 1/2 min back 4.1 resistance    Serratus anterior punches 5# 2 x 15  Side lying  abduction  2 x 15 to 90 deg 1#  Prone mid trap 2 x 12 2#  Prone shoulder ext 2 x 15 3 1/2#  Prone rows 2 x 20 5#  Side lying AA to ER,  control descent x 8  Sitting - isometrics - Flex/Ext/Abd, ER/IR- x 10 each, 3 sec hold, sub max.  Standing flexion x 10 1#   Standing abduction x 10 no weight  IR BlueTB 2 x 15  Standing isometric ER L with R movement x 10, hold 3 sec YTB  Standing flexion 1# x 10  Standing abduction no weight x 10  Push ups x 15 reps - not completed. UBE 2 1/2 forwd, 2 1/2 back 4.0 resistance   Serratus anterior punches 5# 2 x 15  Side lying  abduction  2 x 15 to 90 deg 1#  Prone mid trap 2 x 15 2 1/2#  Prone shoulder ext 2 x 15 3 1/2#  Prone rows 2 x 15 7#  Side lying AA to ER,  control descent x 8  Sitting - isometrics - Flex/Ext/Abd, ER/IR- x 10 each, 3 sec hold, sub max.  Standing flexion x 10 1#   Standing abduction x 10 no weight  IR BlueTB 2 x 15  Standing isometric ER L with R movement x 10, hold 3 sec YTB  Standing bicep curls 3# 2 x 12  Push ups x 20 reps    Therapeutic Exercise Minutes 42 40 42 40   Total Time Of Timed Procedures 42 40 42 40   Total Time Of Service-Based Procedures 0 0 0 0   Total Treatment Time 42 40 42 40        HEP  Independent with ongoing HEP    Charges      Ex 3

## 2025-07-21 ENCOUNTER — OFFICE VISIT (OUTPATIENT)
Dept: PHYSICAL THERAPY | Age: 65
End: 2025-07-21
Payer: OTHER MISCELLANEOUS

## 2025-07-21 PROCEDURE — 97110 THERAPEUTIC EXERCISES: CPT | Performed by: PHYSICAL THERAPIST

## 2025-07-21 NOTE — PROGRESS NOTES
Patient: Jose Medrano (64 year old, male) Referring Provider:  Insurance:   Diagnosis: Complete rotator cuff tear of left shoulder (M75.122) [facility]   Date of Surgery: NA Next MD visit:  N/A   Precautions:  None No data recorded Referral Information:    Date of Evaluation: Req: 10, Auth: 10, Exp: 6/20/2026 06/24/25 POC Auth Visits:  10       Today's Date   7/21/2025    Subjective  Patient reports doing ok       Pain: 0/10     Objective  see outpatient daily record.            Assessment   Patient completed all exercises, fatigue noted but no c/o increase in pain.     Goals (to be met in 10 visits)   1/ Patient independent with HEP  2/  Patient to have increase in strength by 1/2 mm grade or greater with ER and flexion to be able to reach up and to side easier  3/ Patient to report pain levels at higher 3/10 with daily activities.                                    Plan  Continue work on stabilization, strengthening.    Treatment Last 4 Visits  Treatment Day: 7       7/7/2025 7/15/2025 7/17/2025 7/21/2025   UE Treatment   Therapeutic Exercise UBE 21/2 min frwd, 2 1/2 min back 4.1 resistance    Serratus anterior punches 4# 2 x 15  Side lying  abduction  2 x 15 to 90 deg 1#  Prone mid trap 2 x 12 2#  Prone shoulder ext 2 x 15 3#  Prone rows 2 x 20 5#  Side lying AA to ER,  control descent x 8  Sitting - isometrics - Flex/Ext/Abd, ER/IR- x 10 each, 3 sec hold, sub max.  IR BlueTB 2 x 15  Standing isometric ER L with R movement x 10, hold 3 sec YTB  Push ups x 15 reps  UBE 21/2 min frwd, 2 1/2 min back 4.1 resistance    Serratus anterior punches 5# 2 x 15  Side lying  abduction  2 x 15 to 90 deg 1#  Prone mid trap 2 x 12 2#  Prone shoulder ext 2 x 15 3 1/2#  Prone rows 2 x 20 5#  Side lying AA to ER,  control descent x 8  Sitting - isometrics - Flex/Ext/Abd, ER/IR- x 10 each, 3 sec hold, sub max.  Standing flexion x 10 1#   Standing abduction x 10 no weight  IR BlueTB 2 x 15  Standing isometric ER L  with R movement x 10, hold 3 sec YTB  Standing flexion 1# x 10  Standing abduction no weight x 10  Push ups x 15 reps - not completed. UBE 2 1/2 forwd, 2 1/2 back 4.0 resistance   Serratus anterior punches 5# 2 x 15  Side lying  abduction  2 x 15 to 90 deg 1#  Prone mid trap 2 x 15 2 1/2#  Prone shoulder ext 2 x 15 3 1/2#  Prone rows 2 x 15 7#  Side lying AA to ER,  control descent x 8  Sitting - isometrics - Flex/Ext/Abd, ER/IR- x 10 each, 3 sec hold, sub max.  Standing flexion x 10 1#   Standing abduction x 10 no weight  IR BlueTB 2 x 15  Standing isometric ER L with R movement x 10, hold 3 sec YTB  Standing bicep curls 3# 2 x 12  Push ups x 20 reps  UBE 2 1/2 forwd, 2 1/2 back 4.0 resistance   Serratus anterior punches 5# 2 x 15  Side lying  abduction  2 x 15 to 90 deg 1#  Prone mid trap 2 x 15 2 1/2#  Prone shoulder ext 2 x 15 3 1/2#  Prone rows 2 x 15 7#  Side lying AA to ER,  control descent x 8  Sitting - isometrics - Flex/Ext/Abd, ER/IR- x 10 each, 3 sec hold, sub max.  Standing flexion x 10 1#   Standing abduction x 10 no weight  IR BlueTB 2 x 15  Standing isometric ER L with R movement x 10, hold 3 sec YTB  Standing bicep curls 3# 2 x 12- not completed.  Push ups x 20 reps    Therapeutic Exercise Minutes 40 42 40 40   Total Time Of Timed Procedures 40 42 40 40   Total Time Of Service-Based Procedures 0 0 0 0   Total Treatment Time 40 42 40 40        HEP  No changes    Charges      Ex 3

## 2025-07-23 ENCOUNTER — OFFICE VISIT (OUTPATIENT)
Dept: PHYSICAL THERAPY | Age: 65
End: 2025-07-23
Payer: OTHER MISCELLANEOUS

## 2025-07-23 PROCEDURE — 97110 THERAPEUTIC EXERCISES: CPT | Performed by: PHYSICAL THERAPIST

## 2025-07-23 NOTE — PROGRESS NOTES
Patient: Jose Medrano (64 year old, male) Referring Provider:  Insurance:   Diagnosis: Complete rotator cuff tear of left shoulder (M75.122) Maicol Pelon  [employer]   Date of Surgery: NA Next MD visit:  N/A   Precautions:  None No data recorded Referral Information:    Date of Evaluation: Req: 10, Auth: 10, Exp: 6/20/2026 06/24/25 POC Auth Visits:  10    PROGRESS NOTE   Today's Date   7/23/2025    Subjective  Patient reports sees MD tomorrow.  States overall arm is feeling stronger.  States still difficulty holding arm overhead for activities.        Pain: 2/10     Objective             Flex 155 155 5/5 4+/5     Ext 75 60 5/5 5/5     Abd (C5) 160 155 5/5 5/5     IR lower thoracic lower thoraicic 5/5 5/5     ER 80  80 passive 5/5 2+/5 with pain     Low Trap n/a       Mid Trap L 4/5,  R 5/5       SA n/a             Assessment   There has been some improvement with ROM at L shoulder and some improvement with strength for Flexion from 4- to 4+.  Shoulder strength into abd at 90 and below 5/5.  Anything weighted above 90 into flex or abduction is limited with weight and reps.  IR testing 5/5.  ER continues with 2+/5 strength, unable to move through motion against gravity.  Patient reports minimal pain overall but difficulty holding arm overhead for work activities.  Patient has been compliant with HEP    Goals (to be met in 10 visits)   1/ Patient independent with HEP  2/  Patient to have increase in strength by 1/2 mm grade or greater with ER and flexion to be able to reach up and to side easier  3/ Patient to report pain levels at higher 3/10 with daily activities.                                        Plan   Continue 2 additional visits, await outcome of MD visit.     Treatment Last 4 Visits  Treatment Day: 9       7/15/2025 7/17/2025 7/21/2025 7/23/2025   UE Treatment   Therapeutic Exercise UBE 21/2 min frwd, 2 1/2 min back 4.1 resistance    Serratus anterior punches 5# 2 x 15  Side lying  abduction  2 x 15 to  90 deg 1#  Prone mid trap 2 x 12 2#  Prone shoulder ext 2 x 15 3 1/2#  Prone rows 2 x 20 5#  Side lying AA to ER,  control descent x 8  Sitting - isometrics - Flex/Ext/Abd, ER/IR- x 10 each, 3 sec hold, sub max.  Standing flexion x 10 1#   Standing abduction x 10 no weight  IR BlueTB 2 x 15  Standing isometric ER L with R movement x 10, hold 3 sec YTB  Standing flexion 1# x 10  Standing abduction no weight x 10  Push ups x 15 reps - not completed. UBE 2 1/2 forwd, 2 1/2 back 4.0 resistance   Serratus anterior punches 5# 2 x 15  Side lying  abduction  2 x 15 to 90 deg 1#  Prone mid trap 2 x 15 2 1/2#  Prone shoulder ext 2 x 15 3 1/2#  Prone rows 2 x 15 7#  Side lying AA to ER,  control descent x 8  Sitting - isometrics - Flex/Ext/Abd, ER/IR- x 10 each, 3 sec hold, sub max.  Standing flexion x 10 1#   Standing abduction x 10 no weight  IR BlueTB 2 x 15  Standing isometric ER L with R movement x 10, hold 3 sec YTB  Standing bicep curls 3# 2 x 12  Push ups x 20 reps  UBE 2 1/2 forwd, 2 1/2 back 4.0 resistance   Serratus anterior punches 5# 2 x 15  Side lying  abduction  2 x 15 to 90 deg 1#  Prone mid trap 2 x 15 2 1/2#  Prone shoulder ext 2 x 15 3 1/2#  Prone rows 2 x 15 7#  Side lying AA to ER,  control descent x 8  Sitting - isometrics - Flex/Ext/Abd, ER/IR- x 10 each, 3 sec hold, sub max.  Standing flexion x 10 1#   Standing abduction x 10 no weight  IR BlueTB 2 x 15  Standing isometric ER L with R movement x 10, hold 3 sec YTB  Standing bicep curls 3# 2 x 12- not completed.  Push ups x 20 reps  UBE 2 1/2 forwd, 2 1/2 back 4.0 resistance   Serratus anterior punches 5# 2 x 15  Side lying  abduction  2 x 15 to 90 deg 1#  Prone mid trap 2 x 15 2 1/2#  Prone shoulder ext 2 x 15 3 1/2#  Prone rows 2 x 15 7#  Side lying AA to ER,  control descent x 8  Standing flexion x 10 1# - attempted second set, only could do 4  Standing abduction x 10 1#  IR BlueTB 2 x 15  Standing isometric ER L with R movement x 10, hold 3 sec  YTB  Push ups x 20 reps    Therapeutic Exercise Minutes 42 40 40 40   Total Time Of Timed Procedures 42 40 40 40   Total Time Of Service-Based Procedures 0 0 0 0   Total Treatment Time 42 40 40 40        HEP  Independent with ongoing HEP    Charges   Ex 3

## 2025-07-28 ENCOUNTER — OFFICE VISIT (OUTPATIENT)
Dept: PHYSICAL THERAPY | Age: 65
End: 2025-07-28
Payer: OTHER MISCELLANEOUS

## 2025-07-28 PROCEDURE — 97110 THERAPEUTIC EXERCISES: CPT | Performed by: PHYSICAL THERAPIST

## 2025-07-28 NOTE — PROGRESS NOTES
Patient: Jose Medrano (64 year old, male) Referring Provider:  Insurance:   Diagnosis: Complete rotator cuff tear of left shoulder (M75.122) Maicol Bird  [employer] COMP   Date of Surgery: NA Next MD visit:  N/A   Precautions:  None No data recorded Referral Information:    Date of Evaluation: Req: 10, Auth: 10, Exp: 6/20/2026 06/24/25 POC Auth Visits:  10       Today's Date   7/28/2025    Subjective  Patient reports MD wanted him to continue another month then see him again.  States pain continues to be 2-3/10.       Pain: 2/10     Objective  see outpatient daily record.          Assessment   Patient able to increase some weight with exercises this session.  Continuing to work on strengthening of L UE.  Still marked weakness into ER .      Goals (to be met in 10 visits)   1/ Patient independent with HEP- met  2/  Patient to have increase in strength by 1/2 mm grade or greater with ER and flexion to be able to reach up and to side easier- met for flexion, not ER  3/ Patient to report pain levels at highest 3/10 with daily activities. - reporting 2-3/10                                           Plan  Continue work on stabilization, strengthening.  Awaiting further approval of workmans comp.     Treatment Last 4 Visits  Treatment Day: 10       7/17/2025 7/21/2025 7/23/2025 7/28/2025   UE Treatment   Therapeutic Exercise UBE 2 1/2 forwd, 2 1/2 back 4.0 resistance   Serratus anterior punches 5# 2 x 15  Side lying  abduction  2 x 15 to 90 deg 1#  Prone mid trap 2 x 15 2 1/2#  Prone shoulder ext 2 x 15 3 1/2#  Prone rows 2 x 15 7#  Side lying AA to ER,  control descent x 8  Sitting - isometrics - Flex/Ext/Abd, ER/IR- x 10 each, 3 sec hold, sub max.  Standing flexion x 10 1#   Standing abduction x 10 no weight  IR BlueTB 2 x 15  Standing isometric ER L with R movement x 10, hold 3 sec YTB  Standing bicep curls 3# 2 x 12  Push ups x 20 reps  UBE 2 1/2 forwd, 2 1/2 back 4.0 resistance   Serratus anterior punches 5# 2 x  15  Side lying  abduction  2 x 15 to 90 deg 1#  Prone mid trap 2 x 15 2 1/2#  Prone shoulder ext 2 x 15 3 1/2#  Prone rows 2 x 15 7#  Side lying AA to ER,  control descent x 8  Sitting - isometrics - Flex/Ext/Abd, ER/IR- x 10 each, 3 sec hold, sub max.  Standing flexion x 10 1#   Standing abduction x 10 no weight  IR BlueTB 2 x 15  Standing isometric ER L with R movement x 10, hold 3 sec YTB  Standing bicep curls 3# 2 x 12- not completed.  Push ups x 20 reps  UBE 2 1/2 forwd, 2 1/2 back 4.0 resistance   Serratus anterior punches 5# 2 x 15  Side lying  abduction  2 x 15 to 90 deg 1#  Prone mid trap 2 x 15 2 1/2#  Prone shoulder ext 2 x 15 3 1/2#  Prone rows 2 x 15 7#  Side lying AA to ER,  control descent x 8  Standing flexion x 10 1# - attempted second set, only could do 4  Standing abduction x 10 1#  IR BlueTB 2 x 15  Standing isometric ER L with R movement x 10, hold 3 sec YTB  Push ups x 20 reps  UBE 2 1/2 forwd, 2 1/2 back 4.5 resistance   Serratus anterior punches 5# 2 x 15  Side lying  abduction  2 x 15 to 90 deg 1 1/2#  Prone mid trap 2 x 15 3#  Prone shoulder ext 2 x 15 4#  Prone rows 2 x 15 8#  Side lying AA to ER,  control descent x 8  Standing flexion x 10 1# -  Standing abduction x 10 1#  IR [place]  2 x 15  Standing isometric ER L with R movement x 10, hold 3 sec YTB  Push ups x 20 reps   Seated isometrics all motions x 5   Therapeutic Exercise Minutes 40 40 40 40   Total Time Of Timed Procedures 40 40 40 40   Total Time Of Service-Based Procedures 0 0 0 0   Total Treatment Time 40 40 40 40        HEP  Independent    Charges   Ex 3

## 2025-07-30 ENCOUNTER — TELEPHONE (OUTPATIENT)
Dept: PHYSICAL THERAPY | Facility: HOSPITAL | Age: 65
End: 2025-07-30

## 2025-07-30 ENCOUNTER — OFFICE VISIT (OUTPATIENT)
Dept: PHYSICAL THERAPY | Age: 65
End: 2025-07-30
Payer: OTHER MISCELLANEOUS

## 2025-07-30 PROCEDURE — 97110 THERAPEUTIC EXERCISES: CPT | Performed by: PHYSICAL THERAPIST

## 2025-08-05 ENCOUNTER — OFFICE VISIT (OUTPATIENT)
Dept: PHYSICAL THERAPY | Age: 65
End: 2025-08-05

## 2025-08-05 PROCEDURE — 97110 THERAPEUTIC EXERCISES: CPT

## 2025-08-07 ENCOUNTER — OFFICE VISIT (OUTPATIENT)
Dept: PHYSICAL THERAPY | Age: 65
End: 2025-08-07

## 2025-08-07 PROCEDURE — 97110 THERAPEUTIC EXERCISES: CPT

## 2025-08-11 ENCOUNTER — OFFICE VISIT (OUTPATIENT)
Dept: PHYSICAL THERAPY | Age: 65
End: 2025-08-11

## 2025-08-11 PROCEDURE — 97110 THERAPEUTIC EXERCISES: CPT | Performed by: PHYSICAL THERAPIST

## 2025-08-14 ENCOUNTER — OFFICE VISIT (OUTPATIENT)
Dept: PHYSICAL THERAPY | Age: 65
End: 2025-08-14

## 2025-08-14 PROCEDURE — 97110 THERAPEUTIC EXERCISES: CPT | Performed by: PHYSICAL THERAPIST

## 2025-08-19 ENCOUNTER — OFFICE VISIT (OUTPATIENT)
Dept: PHYSICAL THERAPY | Age: 65
End: 2025-08-19

## 2025-08-19 PROCEDURE — 97110 THERAPEUTIC EXERCISES: CPT | Performed by: PHYSICAL THERAPIST

## 2025-08-21 ENCOUNTER — APPOINTMENT (OUTPATIENT)
Dept: PHYSICAL THERAPY | Age: 65
End: 2025-08-21

## 2025-08-26 ENCOUNTER — OFFICE VISIT (OUTPATIENT)
Dept: PHYSICAL THERAPY | Age: 65
End: 2025-08-26

## 2025-08-28 ENCOUNTER — APPOINTMENT (OUTPATIENT)
Dept: PHYSICAL THERAPY | Age: 65
End: 2025-08-28

## (undated) DEVICE — Device: Brand: CUSTOM PROCEDURE KIT

## (undated) DEVICE — Device: Brand: DEFENDO AIR/WATER/SUCTION AND BIOPSY VALVE

## (undated) DEVICE — SNARE CAPTIFLEX MICRO-OVL OLY

## (undated) DEVICE — FORCEP RADIAL JAW 4

## (undated) DEVICE — LINE MNTR ADLT SET O2 INTMD

## (undated) DEVICE — 35 ML SYRINGE REGULAR TIP: Brand: MONOJECT

## (undated) NOTE — LETTER
July 6, 2017         Maria Isabel Navarro DO  [address]      Patient: Kota Brambila   YOB: 1960   Date of Visit: 7/6/2017       Dear Dr. Klever Barrera saw your patient, Kota Brambila, on 7/6/20

## (undated) NOTE — MR AVS SNAPSHOT
[facility]  [address] [place] [address], [address] [place] [address]               Thank you for choosing us for your health care visit with Cheko Yusuf. DO Gayla.   We are glad to serve you and happy to provide you with this summary Where to Get Your Medications      These medications were sent to [facility], [address], [facility] [address]     Phone:  [phone]    - Fluticasone-Salmeterol active are less likely to develop some chronic diseases than adults who are inactive.      HOW TO GET STARTED: HOW TO STAY MOTIVATED:   Start activities slowly and build up over time Do what you like   Get your heart pumping – brisk walking, biking, swimmin

## (undated) NOTE — LETTER
[address], [place]  Authorization for Invasive Procedures  1.  I hereby authorize Dr. Thierno Coffey , my physician and whomever may be designated as the doctor's assistant, to perform the following operation and/or procedure:  Colono fever and allergic reactions, hemolytic reactions, transmission of disease such as hepatitis, AIDS, cytomegalovirus (CMV), and flluid overload.  In the event that I wish to have autologous transfusions of my own blood, or a directed donor transfusion, I annie Signature of Patient:  ________________________________________________ Date: _________Time: _________    Responsible person in case of minor or unconscious: _____________________________Relationship: ____________     Witness Signature: ___________________

## (undated) NOTE — LETTER
[facility]  Administration of Anesthesia  1. Naman Hernandez, or _________________________________ acting on his behalf, (Patient) (Dependent/Representative) request to receive anesthesia for my pending procedure/operation/treatment. infections, high spinal block, spinal bleeding, seizure, cardiac arrest and death. 7. AWARENESS: I understand that it is possible (but unlikely) to have explicit memory of events from the operating room while under general anesthesia.   8. ELECTROCONVULSIV unconscious pt /Relationship    My signature below affirms that prior to the time of the procedure, I have explained to the patient and/or his/her guardian, the risks and benefits of undergoing anesthesia, as well as any reasonable alternatives.     _______

## (undated) NOTE — LETTER
12/28/17        [address] [address]      Dear Valeriy Vallejo,    [address] [facility] records indicate that you have outstanding lab work and or testing that was ordered for you and has not yet been completed:          ALT(SGPT) [E]

## (undated) NOTE — LETTER
[facility], [address] [place] [address], [facility]  [address]  [facility] [id number]  [phone]                09/16/20      [address] [place] [id number]        Dear Houston Pack,    I reviewed the pathology r